# Patient Record
Sex: FEMALE | Race: WHITE | Employment: UNEMPLOYED | URBAN - METROPOLITAN AREA
[De-identification: names, ages, dates, MRNs, and addresses within clinical notes are randomized per-mention and may not be internally consistent; named-entity substitution may affect disease eponyms.]

---
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
## 2020-07-16 ENCOUNTER — ANESTHESIA EVENT (OUTPATIENT)
Dept: SURGERY | Age: 20
End: 2020-07-16
Payer: COMMERCIAL

## 2020-07-18 ENCOUNTER — HOSPITAL ENCOUNTER (OUTPATIENT)
Dept: PREADMISSION TESTING | Age: 20
Discharge: HOME | End: 2020-07-18
Attending: NURSE PRACTITIONER
Payer: COMMERCIAL

## 2020-07-18 DIAGNOSIS — Z20.822 ENCOUNTER FOR LABORATORY TESTING FOR COVID-19 VIRUS: ICD-10-CM

## 2020-07-18 PROCEDURE — 87635 SARS-COV-2 COVID-19 AMP PRB: CPT

## 2020-07-20 LAB — SARS-COV-2, COV2NT: NOT DETECTED

## 2020-07-21 NOTE — H&P
Gynecology History and Physical    Name: Thais Fay MRN: [id number] SSN: [id number]    YOB: 2000  Age: 23 y.o. Sex: female       Subjective:      Chief complaint:  Left ovarian cyst    Thurmon Angelucci is a 23 y.o.  female with a history of enlarging complex left ovarian cyst suspicious for dermoid. Ultrasound imaging done 6/19/20 showed a 4cm complext cyst with solid components. Previous treatment measures included observation but the cyst increased in size over 3 months. She has intermittent pelvic pain but it is usually tolerable. She is admitted for Procedure(s) (LRB):  LAPAROSCOPIC LEFT OVARIAN CYSTECTOMY, POSSIBLE OOPHORECTOMY (Left). The current method of family planning is oral contraceptives. Past Medical History:   Diagnosis Date    Anxiety     Gastritis     GERD (gastroesophageal reflux disease)     Mono exposure      Past Surgical History:   Procedure Laterality Date    HX ENDOSCOPY      HX WISDOM TEETH EXTRACTION      UPPER GI ENDOSCOPY,BIOPSY  3/21/2017          No Known Allergies  Prior to Admission medications    Medication Sig Start Date End Date Taking? Authorizing Provider   norgestrel-ethinyl estradiol (LOW-OGESTREL, 28, PO) Take 1 Tab by mouth nightly. Yes Provider, Historical      Family History   Problem Relation Age of Onset    No Known Problems Mother     Other Father         \"mean\" after anesthesia    Cancer Maternal Grandfather         prostate/lung    Cancer Paternal Grandfather         ? where it started    No Known Problems Maternal Grandmother     No Known Problems Paternal Grandmother     Other Brother         AUTISM    Anesth Problems Neg Hx      OB History    No obstetric history on file.        Social History     Socioeconomic History    Marital status: SINGLE     Spouse name: Not on file    Number of children: Not on file    Years of education: Not on file    Highest education level: Not on file   Occupational History    Not on file Social Needs    Financial resource strain: Not on file    Food insecurity     Worry: Not on file     Inability: Not on file    Transportation needs     Medical: Not on file     Non-medical: Not on file   Tobacco Use    Smoking status: Never Smoker    Smokeless tobacco: Never Used   Substance and Sexual Activity    Alcohol use: Yes     Alcohol/week: 2.0 standard drinks     Types: 2 Cans of beer per week     Comment: 2/WK    Drug use: No    Sexual activity: Never   Lifestyle    Physical activity     Days per week: Not on file     Minutes per session: Not on file    Stress: Not on file   Relationships    Social connections     Talks on phone: Not on file     Gets together: Not on file     Attends Jain service: Not on file     Active member of club or organization: Not on file     Attends meetings of clubs or organizations: Not on file     Relationship status: Not on file    Intimate partner violence     Fear of current or ex partner: Not on file     Emotionally abused: Not on file     Physically abused: Not on file     Forced sexual activity: Not on file   Other Topics Concern    Not on file   Social History Narrative    Not on file       Review of Systems:  A comprehensive review of systems was negative except for that written in the History of Present Illness. Objective:     Vitals:    07/21/20 1100   Weight: 59 kg (130 lb)   Height: 5' 9\" (1.753 m)       General:  alert, cooperative, no distress, appears stated age   Skin:  Normal.   Eyes: negative   Mouth: MMM no lesions   Lymph Nodes:  Cervical, supraclavicular, and axillary nodes normal.   Lungs:  Normal effort   Heart:  regular rate and rhythm   Abdomen: soft, non-tender.  Bowel sounds normal. No masses,  no organomegaly   CVA:  absent   Genitourinary: defer exam   Extremities:  extremities normal, atraumatic, no cyanosis or edema   Neurologic:  negative   Psychiatric:  non focal         Assessment:     Complex left ovarian cyst suspicious for dermoid    Plan:     Procedure(s) (LRB):  LAPAROSCOPIC LEFT OVARIAN CYSTECTOMY, POSSIBLE OOPHORECTOMY (Left)  Discussed the risks of surgery including the risks of bleeding, infection, deep vein thrombosis, and surgical injuries to internal organs including but not limited to the bowels, bladder, rectum, and female reproductive organs. The patient understands the risks; any and all questions were answered to the patient's satisfaction. The patient was counseled at length about the risks of bertha Covid-19 during their perioperative period and any recovery window from their procedure. The patient was made aware that bertha Covid-19  may worsen their prognosis for recovering from their procedure and lend to a higher morbidity and/or mortality risk. All material risks, benefits, and reasonable alternatives including postponing the procedure were discussed. The patient does  wish to proceed with the procedure at this time.       Signed By:  Ana Martins MD     July 21, 2020

## 2020-07-21 NOTE — PERIOP NOTES
Preop phone call completed. Preop instructions and preop medications reveiwed with patient. Pt instructed to Purchase 2  4 oz bottles of CHG soap and instructed in use. PT INSTRUCTED TO GO TO CELL PHONE LOT DOS AND TO CALL REGISTRATION PRIOR TO [facility]. PT AGREED.

## 2020-07-22 ENCOUNTER — HOSPITAL ENCOUNTER (OUTPATIENT)
Age: 20
Setting detail: OUTPATIENT SURGERY
Discharge: HOME | End: 2020-07-22
Attending: OBSTETRICS & GYNECOLOGY | Admitting: OBSTETRICS & GYNECOLOGY
Payer: COMMERCIAL

## 2020-07-22 ENCOUNTER — ANESTHESIA (OUTPATIENT)
Dept: SURGERY | Age: 20
End: 2020-07-22
Payer: COMMERCIAL

## 2020-07-22 VITALS
OXYGEN SATURATION: 99 % | HEIGHT: 69 IN | BODY MASS INDEX: 19.26 KG/M2 | WEIGHT: 130 LBS | TEMPERATURE: 98 F | DIASTOLIC BLOOD PRESSURE: 66 MMHG | RESPIRATION RATE: 16 BRPM | SYSTOLIC BLOOD PRESSURE: 110 MMHG | HEART RATE: 72 BPM

## 2020-07-22 DIAGNOSIS — Z98.890 STATUS POST LAPAROSCOPY: Primary | ICD-10-CM

## 2020-07-22 LAB
HCG UR QL: NEGATIVE
HGB BLD-MCNC: 12.1 G/DL (ref 11.5–16)

## 2020-07-22 PROCEDURE — 81025 URINE PREGNANCY TEST: CPT

## 2020-07-22 PROCEDURE — 77030031139 HC SUT VCRL2 J&J -A: Performed by: OBSTETRICS & GYNECOLOGY

## 2020-07-22 PROCEDURE — 74011000250 HC RX REV CODE- 250: Performed by: NURSE ANESTHETIST, CERTIFIED REGISTERED

## 2020-07-22 PROCEDURE — 88307 TISSUE EXAM BY PATHOLOGIST: CPT

## 2020-07-22 PROCEDURE — 85018 HEMOGLOBIN: CPT

## 2020-07-22 PROCEDURE — 77030040922 HC BLNKT HYPOTHRM STRY -A

## 2020-07-22 PROCEDURE — 77030026438 HC STYL ET INTUB CARD -A: Performed by: ANESTHESIOLOGY

## 2020-07-22 PROCEDURE — 77030012770 HC TRCR OPT FX AMR -B: Performed by: OBSTETRICS & GYNECOLOGY

## 2020-07-22 PROCEDURE — 77030020829: Performed by: OBSTETRICS & GYNECOLOGY

## 2020-07-22 PROCEDURE — 77030016151 HC PROTCTR LNS DFOG COVD -B: Performed by: OBSTETRICS & GYNECOLOGY

## 2020-07-22 PROCEDURE — 77030008684 HC TU ET CUF COVD -B: Performed by: ANESTHESIOLOGY

## 2020-07-22 PROCEDURE — 76210000021 HC REC RM PH II 0.5 TO 1 HR: Performed by: OBSTETRICS & GYNECOLOGY

## 2020-07-22 PROCEDURE — 77030002933 HC SUT MCRYL J&J -A: Performed by: OBSTETRICS & GYNECOLOGY

## 2020-07-22 PROCEDURE — 76210000016 HC OR PH I REC 1 TO 1.5 HR: Performed by: OBSTETRICS & GYNECOLOGY

## 2020-07-22 PROCEDURE — 76010000153 HC OR TIME 1.5 TO 2 HR: Performed by: OBSTETRICS & GYNECOLOGY

## 2020-07-22 PROCEDURE — 74011250637 HC RX REV CODE- 250/637: Performed by: ANESTHESIOLOGY

## 2020-07-22 PROCEDURE — 77030018836 HC SOL IRR NACL ICUM -A: Performed by: OBSTETRICS & GYNECOLOGY

## 2020-07-22 PROCEDURE — 76060000034 HC ANESTHESIA 1.5 TO 2 HR: Performed by: OBSTETRICS & GYNECOLOGY

## 2020-07-22 PROCEDURE — 74011250636 HC RX REV CODE- 250/636: Performed by: NURSE ANESTHETIST, CERTIFIED REGISTERED

## 2020-07-22 PROCEDURE — 77030008756 HC TU IRR SUC STRY -B: Performed by: OBSTETRICS & GYNECOLOGY

## 2020-07-22 PROCEDURE — 77030040830 HC CATH URETH FOL MDII -A: Performed by: OBSTETRICS & GYNECOLOGY

## 2020-07-22 PROCEDURE — 77030011640 HC PAD GRND REM COVD -A: Performed by: OBSTETRICS & GYNECOLOGY

## 2020-07-22 PROCEDURE — 74011000250 HC RX REV CODE- 250: Performed by: OBSTETRICS & GYNECOLOGY

## 2020-07-22 PROCEDURE — 74011250636 HC RX REV CODE- 250/636: Performed by: ANESTHESIOLOGY

## 2020-07-22 RX ORDER — SODIUM CHLORIDE 0.9 % (FLUSH) 0.9 %
5-40 SYRINGE (ML) INJECTION AS NEEDED
Status: DISCONTINUED | OUTPATIENT
Start: 2020-07-22 | End: 2020-07-22 | Stop reason: HOSPADM

## 2020-07-22 RX ORDER — SODIUM CHLORIDE, SODIUM LACTATE, POTASSIUM CHLORIDE, CALCIUM CHLORIDE 600; 310; 30; 20 MG/100ML; MG/100ML; MG/100ML; MG/100ML
75 INJECTION, SOLUTION INTRAVENOUS CONTINUOUS
Status: DISCONTINUED | OUTPATIENT
Start: 2020-07-22 | End: 2020-07-22 | Stop reason: HOSPADM

## 2020-07-22 RX ORDER — MORPHINE SULFATE 10 MG/ML
2 INJECTION, SOLUTION INTRAMUSCULAR; INTRAVENOUS
Status: DISCONTINUED | OUTPATIENT
Start: 2020-07-22 | End: 2020-07-22 | Stop reason: HOSPADM

## 2020-07-22 RX ORDER — MIDAZOLAM HYDROCHLORIDE 1 MG/ML
INJECTION, SOLUTION INTRAMUSCULAR; INTRAVENOUS AS NEEDED
Status: DISCONTINUED | OUTPATIENT
Start: 2020-07-22 | End: 2020-07-22 | Stop reason: HOSPADM

## 2020-07-22 RX ORDER — SODIUM CHLORIDE 0.9 % (FLUSH) 0.9 %
5-40 SYRINGE (ML) INJECTION EVERY 8 HOURS
Status: DISCONTINUED | OUTPATIENT
Start: 2020-07-22 | End: 2020-07-22 | Stop reason: HOSPADM

## 2020-07-22 RX ORDER — HYDROMORPHONE HYDROCHLORIDE 1 MG/ML
0.2 INJECTION, SOLUTION INTRAMUSCULAR; INTRAVENOUS; SUBCUTANEOUS
Status: DISCONTINUED | OUTPATIENT
Start: 2020-07-22 | End: 2020-07-22 | Stop reason: HOSPADM

## 2020-07-22 RX ORDER — MIDAZOLAM HYDROCHLORIDE 1 MG/ML
0.5 INJECTION, SOLUTION INTRAMUSCULAR; INTRAVENOUS
Status: DISCONTINUED | OUTPATIENT
Start: 2020-07-22 | End: 2020-07-22 | Stop reason: HOSPADM

## 2020-07-22 RX ORDER — NEOSTIGMINE METHYLSULFATE 1 MG/ML
INJECTION, SOLUTION INTRAVENOUS AS NEEDED
Status: DISCONTINUED | OUTPATIENT
Start: 2020-07-22 | End: 2020-07-22 | Stop reason: HOSPADM

## 2020-07-22 RX ORDER — OXYCODONE AND ACETAMINOPHEN 5; 325 MG/1; MG/1
1 TABLET ORAL
Qty: 12 TAB | Refills: 0 | Status: SHIPPED | OUTPATIENT
Start: 2020-07-22 | End: 2020-07-25

## 2020-07-22 RX ORDER — MIDAZOLAM HYDROCHLORIDE 1 MG/ML
1 INJECTION, SOLUTION INTRAMUSCULAR; INTRAVENOUS AS NEEDED
Status: DISCONTINUED | OUTPATIENT
Start: 2020-07-22 | End: 2020-07-22 | Stop reason: HOSPADM

## 2020-07-22 RX ORDER — IBUPROFEN 600 MG/1
600 TABLET ORAL
Qty: 30 TAB | Refills: 2 | Status: SHIPPED | OUTPATIENT
Start: 2020-07-22

## 2020-07-22 RX ORDER — PROPOFOL 10 MG/ML
INJECTION, EMULSION INTRAVENOUS AS NEEDED
Status: DISCONTINUED | OUTPATIENT
Start: 2020-07-22 | End: 2020-07-22 | Stop reason: HOSPADM

## 2020-07-22 RX ORDER — HYDROMORPHONE HYDROCHLORIDE 2 MG/ML
INJECTION, SOLUTION INTRAMUSCULAR; INTRAVENOUS; SUBCUTANEOUS AS NEEDED
Status: DISCONTINUED | OUTPATIENT
Start: 2020-07-22 | End: 2020-07-22 | Stop reason: HOSPADM

## 2020-07-22 RX ORDER — SODIUM CHLORIDE 9 MG/ML
25 INJECTION, SOLUTION INTRAVENOUS CONTINUOUS
Status: DISCONTINUED | OUTPATIENT
Start: 2020-07-22 | End: 2020-07-22 | Stop reason: HOSPADM

## 2020-07-22 RX ORDER — SODIUM CHLORIDE, SODIUM LACTATE, POTASSIUM CHLORIDE, CALCIUM CHLORIDE 600; 310; 30; 20 MG/100ML; MG/100ML; MG/100ML; MG/100ML
125 INJECTION, SOLUTION INTRAVENOUS CONTINUOUS
Status: DISCONTINUED | OUTPATIENT
Start: 2020-07-22 | End: 2020-07-22 | Stop reason: HOSPADM

## 2020-07-22 RX ORDER — FENTANYL CITRATE 50 UG/ML
INJECTION, SOLUTION INTRAMUSCULAR; INTRAVENOUS AS NEEDED
Status: DISCONTINUED | OUTPATIENT
Start: 2020-07-22 | End: 2020-07-22 | Stop reason: HOSPADM

## 2020-07-22 RX ORDER — DEXAMETHASONE SODIUM PHOSPHATE 4 MG/ML
INJECTION, SOLUTION INTRA-ARTICULAR; INTRALESIONAL; INTRAMUSCULAR; INTRAVENOUS; SOFT TISSUE AS NEEDED
Status: DISCONTINUED | OUTPATIENT
Start: 2020-07-22 | End: 2020-07-22 | Stop reason: HOSPADM

## 2020-07-22 RX ORDER — ROPIVACAINE HYDROCHLORIDE 5 MG/ML
30 INJECTION, SOLUTION EPIDURAL; INFILTRATION; PERINEURAL ONCE
Status: DISCONTINUED | OUTPATIENT
Start: 2020-07-22 | End: 2020-07-22 | Stop reason: HOSPADM

## 2020-07-22 RX ORDER — FENTANYL CITRATE 50 UG/ML
50 INJECTION, SOLUTION INTRAMUSCULAR; INTRAVENOUS AS NEEDED
Status: DISCONTINUED | OUTPATIENT
Start: 2020-07-22 | End: 2020-07-22 | Stop reason: HOSPADM

## 2020-07-22 RX ORDER — LIDOCAINE HYDROCHLORIDE 20 MG/ML
INJECTION, SOLUTION EPIDURAL; INFILTRATION; INTRACAUDAL; PERINEURAL AS NEEDED
Status: DISCONTINUED | OUTPATIENT
Start: 2020-07-22 | End: 2020-07-22 | Stop reason: HOSPADM

## 2020-07-22 RX ORDER — DIPHENHYDRAMINE HYDROCHLORIDE 50 MG/ML
12.5 INJECTION, SOLUTION INTRAMUSCULAR; INTRAVENOUS AS NEEDED
Status: DISCONTINUED | OUTPATIENT
Start: 2020-07-22 | End: 2020-07-22 | Stop reason: HOSPADM

## 2020-07-22 RX ORDER — GLYCOPYRROLATE 0.2 MG/ML
INJECTION INTRAMUSCULAR; INTRAVENOUS AS NEEDED
Status: DISCONTINUED | OUTPATIENT
Start: 2020-07-22 | End: 2020-07-22 | Stop reason: HOSPADM

## 2020-07-22 RX ORDER — ROCURONIUM BROMIDE 10 MG/ML
INJECTION, SOLUTION INTRAVENOUS AS NEEDED
Status: DISCONTINUED | OUTPATIENT
Start: 2020-07-22 | End: 2020-07-22 | Stop reason: HOSPADM

## 2020-07-22 RX ORDER — PHENYLEPHRINE HCL IN 0.9% NACL 0.4MG/10ML
SYRINGE (ML) INTRAVENOUS AS NEEDED
Status: DISCONTINUED | OUTPATIENT
Start: 2020-07-22 | End: 2020-07-22 | Stop reason: HOSPADM

## 2020-07-22 RX ORDER — ONDANSETRON 2 MG/ML
INJECTION INTRAMUSCULAR; INTRAVENOUS AS NEEDED
Status: DISCONTINUED | OUTPATIENT
Start: 2020-07-22 | End: 2020-07-22 | Stop reason: HOSPADM

## 2020-07-22 RX ORDER — LIDOCAINE HYDROCHLORIDE 10 MG/ML
0.1 INJECTION, SOLUTION EPIDURAL; INFILTRATION; INTRACAUDAL; PERINEURAL AS NEEDED
Status: DISCONTINUED | OUTPATIENT
Start: 2020-07-22 | End: 2020-07-22 | Stop reason: HOSPADM

## 2020-07-22 RX ORDER — FENTANYL CITRATE 50 UG/ML
25 INJECTION, SOLUTION INTRAMUSCULAR; INTRAVENOUS
Status: DISCONTINUED | OUTPATIENT
Start: 2020-07-22 | End: 2020-07-22 | Stop reason: HOSPADM

## 2020-07-22 RX ORDER — BUPIVACAINE HYDROCHLORIDE 2.5 MG/ML
INJECTION, SOLUTION EPIDURAL; INFILTRATION; INTRACAUDAL AS NEEDED
Status: DISCONTINUED | OUTPATIENT
Start: 2020-07-22 | End: 2020-07-22 | Stop reason: HOSPADM

## 2020-07-22 RX ORDER — ACETAMINOPHEN 325 MG/1
650 TABLET ORAL ONCE
Status: COMPLETED | OUTPATIENT
Start: 2020-07-22 | End: 2020-07-22

## 2020-07-22 RX ORDER — DEXMEDETOMIDINE HYDROCHLORIDE 100 UG/ML
INJECTION, SOLUTION INTRAVENOUS AS NEEDED
Status: DISCONTINUED | OUTPATIENT
Start: 2020-07-22 | End: 2020-07-22 | Stop reason: HOSPADM

## 2020-07-22 RX ORDER — ONDANSETRON 2 MG/ML
4 INJECTION INTRAMUSCULAR; INTRAVENOUS AS NEEDED
Status: DISCONTINUED | OUTPATIENT
Start: 2020-07-22 | End: 2020-07-22 | Stop reason: HOSPADM

## 2020-07-22 RX ADMIN — ONDANSETRON 4 MG: 2 INJECTION INTRAMUSCULAR; INTRAVENOUS at 14:23

## 2020-07-22 RX ADMIN — DEXAMETHASONE SODIUM PHOSPHATE 8 MG: 4 INJECTION, SOLUTION INTRAMUSCULAR; INTRAVENOUS at 12:18

## 2020-07-22 RX ADMIN — ACETAMINOPHEN 650 MG: 325 TABLET ORAL at 10:05

## 2020-07-22 RX ADMIN — ONDANSETRON HYDROCHLORIDE 4 MG: 2 INJECTION, SOLUTION INTRAMUSCULAR; INTRAVENOUS at 13:18

## 2020-07-22 RX ADMIN — PROPOFOL 50 MG: 10 INJECTION, EMULSION INTRAVENOUS at 13:19

## 2020-07-22 RX ADMIN — GLYCOPYRROLATE 0.4 MG: 0.2 INJECTION, SOLUTION INTRAMUSCULAR; INTRAVENOUS at 13:20

## 2020-07-22 RX ADMIN — PROPOFOL 150 MG: 10 INJECTION, EMULSION INTRAVENOUS at 12:06

## 2020-07-22 RX ADMIN — Medication 2 MG: at 13:20

## 2020-07-22 RX ADMIN — PROPOFOL 50 MG: 10 INJECTION, EMULSION INTRAVENOUS at 12:07

## 2020-07-22 RX ADMIN — Medication 120 MCG: at 12:37

## 2020-07-22 RX ADMIN — HYDROMORPHONE HYDROCHLORIDE 1 MG: 2 INJECTION, SOLUTION INTRAMUSCULAR; INTRAVENOUS; SUBCUTANEOUS at 12:16

## 2020-07-22 RX ADMIN — SODIUM CHLORIDE, SODIUM LACTATE, POTASSIUM CHLORIDE, AND CALCIUM CHLORIDE 125 ML/HR: 600; 310; 30; 20 INJECTION, SOLUTION INTRAVENOUS at 10:30

## 2020-07-22 RX ADMIN — DEXMEDETOMIDINE HYDROCHLORIDE 10 MCG: 100 INJECTION, SOLUTION, CONCENTRATE INTRAVENOUS at 12:16

## 2020-07-22 RX ADMIN — ROCURONIUM BROMIDE 30 MG: 10 SOLUTION INTRAVENOUS at 12:06

## 2020-07-22 RX ADMIN — LIDOCAINE HYDROCHLORIDE 60 MG: 20 INJECTION, SOLUTION EPIDURAL; INFILTRATION; INTRACAUDAL; PERINEURAL at 12:06

## 2020-07-22 RX ADMIN — MIDAZOLAM HYDROCHLORIDE 5 MG: 1 INJECTION, SOLUTION INTRAMUSCULAR; INTRAVENOUS at 12:00

## 2020-07-22 RX ADMIN — DEXMEDETOMIDINE HYDROCHLORIDE 10 MCG: 100 INJECTION, SOLUTION, CONCENTRATE INTRAVENOUS at 12:18

## 2020-07-22 RX ADMIN — PROPOFOL 50 MG: 10 INJECTION, EMULSION INTRAVENOUS at 13:15

## 2020-07-22 RX ADMIN — FENTANYL CITRATE 100 MCG: 50 INJECTION, SOLUTION INTRAMUSCULAR; INTRAVENOUS at 12:06

## 2020-07-22 NOTE — OP NOTES
Gynecologic Laparoscopy Procedure Note    [facility] ID:     [facility]    [facility] Record Number: [id number]    YOB: 2000 July 22, 2020      Preoperative Diagnosis:   COMPLEX LEFT OVARIAN CYST    Postoperative Diagnosis: COMPLEX LEFT OVARIAN CYST    Surgeon: Jerold Alpers, MD    Assistant: Robel Archuleta MD    Anesthesia: General    Procedure: 1.laparoscopic left ovarian cystectomy    Estimated Blood Loss: 20ml    Pathology /Specimens: complex left ovarian cyst    Complications: none    Findings: 4cm left ovarian cyst with spillage c/w \"chocolate\" fluid, normal right ovary, normal tubes bilaterally, normal uterus    Signed By: Jerold Alpers, MD          Procedure in Detail:  The patient was taken to the operating room where she was placed in the supine position. After undergoing adequate general endotracheal anesthesia, she was placed up in the [facility] laparoscopy stirrups in the dorsal lithotomy position. The patient was then prepped and draped in the usual fashion and barbour catheter was placed into the bladder. Attention was first turned to the vagina where the speculum was placed in the vagina. The anterior lip of the cervix was grasped with an allis clamp. Hulka tenaculum was placed without difficulty. All other instruments were removed from the vagina and 's gloves were changed. Attention was then turned to the abdomen. After injection of local, a vertical incision was made in the umbilicus. Direct intra-abdominal location was confirmed visually with the optical trocar. C02 pnuemo-peritoneum was created without difficulty. There was no evidence of bowel injury or bleeding. 2 accessory ports were placed, a 5mm on the right and 10mm on the left lower quadrant in the same fashion under direct visualization and without apparent injury. The above findings were noted. The left ovarian cyst was identifed and incised with inadvertant spillage of chocolate fluid contents. The cyst wall was removed using the harmonic ace and irrigated. Only approximately 80% of the cyst was was able to be fully excised given firm attachment to the ovarian hilum, 20% of cyst wall left behind. Excellent hemostasis was noted. The collapsed cyst was removed through the 10mm trocar. All instruments were removed and CO2 gas was diffused. There was good hemostasis. Left lower quadrant fascia was closed with 0 vicryl. Skin incisions were closed with 4.0 Vicryl in a subcuticular stitch  on the skin. All instruments were removed from the vagina. The patient was awakened, extubated and taken to the recovery room in good condition. All counts were correct x 2.

## 2020-07-22 NOTE — ANESTHESIA POSTPROCEDURE EVALUATION
Post-Anesthesia Evaluation and Assessment    Patient: Reilly Lara MRN: [id number]  SSN: [id number]    YOB: 2000  Age: 23 y.o. Sex: female      I have evaluated the patient and they are stable and ready for discharge from the PACU. Cardiovascular Function/Vital Signs  Visit Vitals  /71   Pulse 84   Temp 36.9 °C (98.5 °F)   Resp 14   Ht 5' 9\" (1.753 m)   Wt 59 kg (130 lb)   SpO2 99%   BMI 19.20 kg/m²       Patient is status post General anesthesia for Procedure(s):  LAPAROSCOPIC LEFT OVARIAN CYSTECTOMY. Nausea/Vomiting: None    Postoperative hydration reviewed and adequate. Pain:  Pain Scale 1: Numeric (0 - 10) (07/22/20 0956)  Pain Intensity 1: 0 (07/22/20 0956)   Managed    Neurological Status: At baseline    Mental Status, Level of Consciousness: Alert and  oriented to person, place, and time    Pulmonary Status:   O2 Device: Room air (07/22/20 1341)   Adequate oxygenation and airway patent    Complications related to anesthesia: None    Post-anesthesia assessment completed. No concerns    Signed By: Arlene Arthur MD     July 22, 2020              Procedure(s):  LAPAROSCOPIC LEFT OVARIAN CYSTECTOMY. general    <BSHSIANPOST>    INITIAL Post-op Vital signs:   Vitals Value Taken Time   /71 7/22/2020  1:41 PM   Temp 36.9 °C (98.5 °F) 7/22/2020  1:41 PM   Pulse 88 7/22/2020  1:45 PM   Resp 12 7/22/2020  1:45 PM   SpO2 87 % 7/22/2020  1:45 PM   Vitals shown include unvalidated device data.

## 2020-07-22 NOTE — PERIOP NOTES
Patient left still with some nausea- given 2nd dose of zofran and Melissa Wei- states this helped a little. Offered to call for more medication but she said no. Was upset that she got narcotic during surgery since she asked for no narcotic.

## 2020-07-22 NOTE — DISCHARGE INSTRUCTIONS
Pelvic Laparoscopy: What to Expect at [address] [place] [address]     After surgery, it's normal to have a sore belly, cramping, or pain around the cuts the doctor made (incisions) for up to 4 days. You can expect to feel better and stronger each day. But you might get tired quickly and need pain medicine for a few days. Some people are able to return to work the day after surgery. Others need to recover for a few days to a few weeks before going back to work. Sometimes pressure from the gas used during surgery causes other side effects. You may have pain in your neck or shoulders. Or you may feel pressure on your bladder and need to urinate more often than usual. These side effects should go away in less than 4 days. Do not lift anything heavy while you are recovering so that your incisions can heal.  This care sheet gives you a general idea about how long it will take for you to recover. But each person recovers at a different pace. Follow the steps below to get better as quickly as possible. How can you care for yourself at home? Activity  · Rest when you feel tired. Getting enough sleep will help you recover. · Try to walk each day. Start out by walking a little more than you did the day before. Bit by bit, increase the amount you walk. Walking boosts blood flow and helps prevent pneumonia and constipation. · For 1 week, avoid lifting anything that would make you strain. This may include a child, heavy grocery bags and milk containers, a heavy briefcase or backpack, cat litter or dog food bags, or a vacuum . · Avoid strenuous activities, such as biking, jogging, weight lifting, and aerobic exercise, for 1 week. · You may shower. Pat the incisions dry when you are done. Do not take a bath for the first week after surgery or until your doctor tells you it is okay. · You may have some light vaginal bleeding. Wear sanitary pads if needed. Do not douche or use tampons.   · You may drive when you are no longer taking prescription pain medicine and can quickly move your foot from the gas pedal to the brake. You must also be able to sit comfortably for a long period of time, even if you do not plan to go far. You might get caught in traffic. · You may need to take a few days to a few weeks off work. It depends on the type of work you do and how you feel. · Do not have sex until your doctor tells you it is okay. Diet  · You can eat your normal diet. If your stomach is upset, try bland, low-fat foods like plain rice, broiled chicken, toast, and yogurt. · Drink plenty of fluids (unless your doctor tells you not to). · You may notice that your bowel movements are not regular right after your surgery. This is common. Try to avoid constipation and straining with bowel movements. You may want to take a fiber supplement every day. If you have not had a bowel movement after a couple of days, ask your doctor about taking a mild laxative. Medicines  · Your doctor will tell you if and when you can restart your medicines. He or she will also give you instructions about taking any new medicines. · If you take aspirin or some other blood thinner, ask your doctor if and when to start taking it again. Make sure that you understand exactly what your doctor wants you to do. · Be safe with medicines. Take pain medicines exactly as directed. ? If the doctor gave you a prescription medicine for pain, take it as prescribed. ? If you are not taking a prescription pain medicine, take an over-the-counter medicine such as acetaminophen (Tylenol), ibuprofen (Advil, Motrin), or naproxen (Aleve). Read and follow all instructions on the label. ? Do not take two or more pain medicines at the same time unless the doctor told you to. Many pain medicines contain acetaminophen, which is Tylenol. Too much acetaminophen (Tylenol) can be harmful.   · If you think your pain medicine is making you sick to your stomach:  ? Take your medicine after meals (unless your doctor tells you not to). ? Ask your doctor for a different pain medicine. · If your doctor prescribed antibiotics, take them as directed. Do not stop taking them just because you feel better. You need to take the full course of antibiotics. Incision care  · If you have Band-Aid on the incisions. You can remove them tomorrow then shower gently washing the incisions. You have stitches that will dissolve on their own. · Wash the area daily with warm, soapy water and pat it dry. · Keep the area clean and dry. You may cover it with a gauze bandage if it weeps or rubs against clothing. Change the bandage every day. Other instructions  · Wear loose, comfortable clothing, and avoid anything that puts pressure on your belly, such as a girdle, for a few weeks. · You may want to use a ice pack or heating pad on your belly to help with pain. Follow-up care is a key part of your treatment and safety. Be sure to make and go to all appointments, and call your doctor if you are having problems. It's also a good idea to know your test results and keep a list of the medicines you take. When should you call for help? [id number] anytime you think you may need emergency care. For example, call if:  · You passed out (lost consciousness). · You have chest pain, are short of breath, or cough up blood. Call your doctor now or seek immediate medical care if:  · You have bright red vaginal bleeding that soaks one or more pads in an hour, or you have large clots. · You are sick to your stomach or cannot drink fluids. · You have vaginal discharge that has increased in amount or smells bad. · You have pain that does not get better after you take pain medicine. · You have signs of infection, such as:  ? Increased pain, swelling, warmth, or redness. ? Red streaks leading from the incision. ? Pus draining from the incision. ? A fever. · You have loose stitches, or your incisions come open.   · Bright red blood has soaked through the bandages over your incisions. · You have signs of a blood clot in your leg (called a deep vein thrombosis), such as:  ? Pain in your calf, back of the knee, thigh, or groin. ? Redness and swelling in your leg. · You cannot pass stools or gas. Watch closely for changes in your health, and be sure to contact your doctor if you have any problems. Where can you learn more? Go to http://anish-regino.info/  Enter M063 in the search box to learn more about \"Pelvic Laparoscopy: What to Expect at Home. \"  Current as of: November 8, 2019               Content Version: 12.5  © 0729-5429 [employer]. Care instructions adapted under license by [employer] (which disclaims liability or warranty for this information). If you have questions about a medical condition or this instruction, always ask your healthcare professional. [address] any warranty or liability for your use of this information. ______________________________________________________________________    Anesthesia Discharge Instructions    After general anesthesia or intervenous sedation, for 24 hours or while taking prescription Narcotics:  · Limit your activities  · Do not drive or operate hazardous machinery  · If you have not urinated within 8 hours after discharge, please contact your surgeon on call. · Do not make important personal or business decisions  · Do not drink alcoholic beverages    Report the following to your surgeon:  · Excessive pain, swelling, redness or odor of or around the surgical area  · Temperature over 100.5 degrees  · Nausea and vomiting lasting longer than 4 hours or if unable to take medication  · Any signs of decreased circulation or nerve impairment to extremity:  Change in color, persistent numbness, tingling, coldness or increased pain.   · Any questions    Learning About Coronavirus [phone])  Coronavirus [phone]): Overview  What is coronavirus (XDBIC-48)? The coronavirus disease (COVID-19) is caused by a virus. It is an illness that was first found in [place], [place], in December 2019. It has since spread worldwide. The virus can cause fever, cough, and trouble breathing. In severe cases, it can cause pneumonia and make it hard to breathe without help. It can cause death. Coronaviruses are a large group of viruses. They cause the common cold. They also cause more serious illnesses like Middle East respiratory syndrome (MERS) and severe acute respiratory syndrome (SARS). COVID-19 is caused by a novel coronavirus. That means it's a new type that has not been seen in people before. This virus spreads person-to-person through droplets from coughing and sneezing. It can also spread when you are close to someone who is infected. And it can spread when you touch something that has the virus on it, such as a doorknob or a tabletop. What can you do to protect yourself from coronavirus (COVID-19)? The best way to protect yourself from getting sick is to:  · Avoid areas where there is an outbreak. · Avoid contact with people who may be infected. · Wash your hands often with soap or alcohol-based hand sanitizers. · Avoid crowds and try to stay at least 6 feet away from other people. · Wash your hands often, especially after you cough or sneeze. Use soap and water, and scrub for at least 20 seconds. If soap and water aren't available, use an alcohol-based hand . · Avoid touching your mouth, nose, and eyes. What can you do to avoid spreading the virus to others? To help avoid spreading the virus to others:  · Cover your mouth with a tissue when you cough or sneeze. Then throw the tissue in the trash. · Use a disinfectant to clean things that you touch often. · Stay home if you are sick or have been exposed to the virus. Don't go to school, work, or public areas. And don't use public transportation.   · If you are sick:  ? Leave your home only if you need to get medical care. But call the doctor's office first so they know you're coming. And wear a face mask, if you have one.  ? If you have a face mask, wear it whenever you're around other people. It can help stop the spread of the virus when you cough or sneeze. ? Clean and disinfect your home every day. Use household  and disinfectant wipes or sprays. Take special care to clean things that you grab with your hands. These include doorknobs, remote controls, phones, and handles on your refrigerator and microwave. And don't forget countertops, tabletops, bathrooms, and computer keyboards. When to call for help  Call 911 anytime you think you may need emergency care. For example, call if:  · You have severe trouble breathing. (You can't talk at all.)  · You have constant chest pain or pressure. · You are severely dizzy or lightheaded. · You are confused or can't think clearly. · Your face and lips have a blue color. · You pass out (lose consciousness) or are very hard to wake up. Call your doctor now if you develop symptoms such as:  · Shortness of breath. · Fever. · Cough. If you need to get care, call ahead to the doctor's office for instructions before you go. Make sure you wear a face mask, if you have one, to prevent exposing other people to the virus. Where can you get the latest information? The following health organizations are tracking and studying this virus. Their websites contain the most up-to-date information. Purnima Safer also learn what to do if you think you may have been exposed to the virus. · U.S. Centers for Disease Control and Prevention (CDC): The CDC provides updated news about the disease and travel advice. The website also tells you how to prevent the spread of infection. www.cdc.gov  · World Health Organization [facility]): WHO offers information about the virus outbreaks.  WHO also has travel advice. www.who.int  Current as of: April 1, 2020               Content Version: 12.4  © 7071-0078 [employer]. Care instructions adapted under license by your healthcare professional. If you have questions about a medical condition or this instruction, always ask your healthcare professional. [address] any warranty or liability for your use of this information.

## 2020-07-22 NOTE — ANESTHESIA PREPROCEDURE EVALUATION
Anesthetic History   No history of anesthetic complications            Review of Systems / Medical History  Patient summary reviewed, nursing notes reviewed and pertinent labs reviewed    Pulmonary  Within defined limits                 Neuro/Psych   Within defined limits           Cardiovascular    Hypertension                   GI/Hepatic/Renal     GERD           Endo/Other  Within defined limits           Other Findings              Physical Exam    Airway  Mallampati: II  TM Distance: > 6 cm  Neck ROM: normal range of motion   Mouth opening: Normal     Cardiovascular  Regular rate and rhythm,  S1 and S2 normal,  no murmur, click, rub, or gallop             Dental  No notable dental hx       Pulmonary  Breath sounds clear to auscultation               Abdominal  GI exam deferred       Other Findings            Anesthetic Plan    ASA: 1  Anesthesia type: general          Induction: Intravenous  Anesthetic plan and risks discussed with: Patient

## 2020-07-22 NOTE — BRIEF OP NOTE
Brief Postoperative Note    Patient: Yue Robledo  YOB: 2000  MRN: [id number]    Date of Procedure: 7/22/2020     Pre-Op Diagnosis: COMPLEX LEFT OVARIAN CYST    Post-Op Diagnosis: same    Procedure(s):  LAPAROSCOPIC LEFT OVARIAN CYSTECTOMY    Surgeon(s):  MD Yaakov Escoto MD    Surgical Assistant: None    Anesthesia: General     Estimated Blood Loss (mL): less than 50     Complications: None    Specimens:   ID Type Source Tests Collected by Time Destination   1 : Left ovarian cyst Fresh Ovarian Cyst, Left  Gorge Bueno, Zenobia Membreno MD 7/22/2020 1257 Pathology        Implants: * No implants in log *    Drains: * No LDAs found *    Findings: 4cm left ovarian cyst with spillage c/w \"chocolate\" fluid, normal right ovary, normal tubes bilaterally, normal uterus    Electronically Signed by Lexie Mercado MD on 7/22/2020 at 1:12 PM

## 2020-07-22 NOTE — H&P
There has been no interval change from H&P dated 7/21/20. Patient is ready for surgery today. To OR for laparoscopic left ovarian cystectomy, possible oophorectomy. Consent obtained and all questions answered.

## 2020-07-31 NOTE — PROGRESS NOTES
[facility] [address], [address]  P [phone]  F [phone]    Office Note  Patient ID:  Name:  Kristan Reyna  MRN:  [id number]  :  2000/ y.o. Date:  8/3/2020      HISTORY OF PRESENT ILLNESS:  Kristan Reyna is a 23 y.o.  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 She is referred by Dr. Ignacio Roman. She was taken to the OR on 20 for an enlarging, complex, left ovarian cyst, suspicious for a dermoid. A pelvic ultrasound had demonstrated a 4 cm complex cyst with solid components. A left ovarian cystectomy was performed. The cyst was ruptured intraoperatively and was clinically consistent with a \"chocolate\" cyst.  According to Dr. Dyan Simon, the cyst was incompletely resected. Due to the surprise pathology findings, I have been asked to see her in consultation. FINAL PATHOLOGIC DIAGNOSIS   Left ovarian cyst, excision:   Borderline serous cystadenofibroma, see comment   Comment   The entire specimen is submitted for histologic evaluation. There are a few small foci of glandular crowding with cellular tufting and mild cytologic atypia compatible with borderline serous tumor. The largest focus measures 0.3 cm in greatest dimension. There is no evidence of invasive tumor. She has recovered well from her procedure and is without complaints today. ROS:   and GI review:  Negative  Cardiopulmonary review:  Negative   Musculoskeletal:  Negative    A comprehensive review of systems was negative except for that written in the History of Present Illness. , 10 point ROS      OB/GYN ROS:    Patient denies any abnormal bleeding or vaginal discharge.        Problem List:  Patient Active Problem List    Diagnosis Date Noted    Borderline serous cystadenoma of left ovary ([address] 2020    Epigastric abdominal pain 2017    Postprandial epigastric pain 03/17/2017    Hemoptysis 03/17/2017    Dyspepsia 03/17/2017    Abnormal weight loss 03/17/2017     PMH:  Past Medical History:   Diagnosis Date    Anxiety     Gastritis     GERD (gastroesophageal reflux disease)     Mono exposure       PSH:  Past Surgical History:   Procedure Laterality Date    HX ENDOSCOPY      HX WISDOM TEETH EXTRACTION      UPPER GI ENDOSCOPY,BIOPSY  3/21/2017           Social History:  Social History     Tobacco Use    Smoking status: Never Smoker    Smokeless tobacco: Never Used   Substance Use Topics    Alcohol use: Yes     Alcohol/week: 2.0 standard drinks     Types: 2 Cans of beer per week     Comment: 2/WK      Family History:  Family History   Problem Relation Age of Onset    No Known Problems Mother     Other Father         \"mean\" after anesthesia    Cancer Maternal Grandfather         prostate/lung    Cancer Paternal Grandfather         ? where it started    No Known Problems Maternal Grandmother     No Known Problems Paternal Grandmother     Other Brother         AUTISM    Anesth Problems Neg Hx       Medications: (reviewed)  Current Outpatient Medications   Medication Sig    famotidine (PEPCID) 20 mg tablet TK 1 T PO BID    ibuprofen (MOTRIN) 600 mg tablet Take 1 Tab by mouth every eight (8) hours as needed for Pain.  norgestrel-ethinyl estradiol (LOW-OGESTREL, 28, PO) Take 1 Tab by mouth nightly. No current facility-administered medications for this visit. Allergies: (reviewed)  No Known Allergies       OBJECTIVE:    Physical Exam:  VITAL SIGNS: Vitals:    08/03/20 1432   BP: 104/75   Pulse: 96   Weight: 132 lb 3.2 oz (60 kg)   Height: 5' 9.02\" (1.753 m)     Body mass index is 19.51 kg/m². GENERAL KRISTOPHER: Conversant, alert, oriented. No acute distress. HEENT: HEENT. No thyroid enlargement. No JVD. Neck: Supple without restrictions. RESPIRATORY: Clear to auscultation and percussion to the bases. No CVAT. CARDIOVASC: RRR without murmur/rub. GASTROINT: soft, non-tender, without masses or organomegaly   MUSCULOSKEL: no joint tenderness, deformity or swelling   EXTREMITIES: extremities normal, atraumatic, no cyanosis or edema   PELVIC: Deferred   RECTAL: Deferred   LORAINE SURVEY: No suspicious lymphadenopathy or edema noted. NEURO: Grossly intact. No acute deficit. Lab Data:    Lab Results   Component Value Date/Time    WBC 5.0 02/28/2017 05:44 PM    HGB 11.6 02/28/2017 05:44 PM    HCT 34.0 02/28/2017 05:44 PM    PLATELET 147 00/37/8267 05:44 PM    MCV 86.1 02/28/2017 05:44 PM     Lab Results   Component Value Date/Time    Sodium 139 02/28/2017 05:44 PM    Potassium 3.2 (L) 02/28/2017 05:44 PM    Chloride 105 02/28/2017 05:44 PM    CO2 27 02/28/2017 05:44 PM    Anion gap 7 02/28/2017 05:44 PM    Glucose 79 02/28/2017 05:44 PM    BUN 9 02/28/2017 05:44 PM    Creatinine 0.57 02/28/2017 05:44 PM    BUN/Creatinine ratio 16 02/28/2017 05:44 PM    GFR est AA Cannot be calulated 02/28/2017 05:44 PM    GFR est non-AA Cannot be calulated 02/28/2017 05:44 PM    Calcium 9.2 02/28/2017 05:44 PM         Imaging:  None      IMPRESSION/PLAN:  Michael Fiore is a 23 y.o. 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 I reviewed with Michael Fiore her medical records, physical exam, and review of symptoms. I explained to the patient and her mother that a borderline tumor is not an actual malignancy, but it's not a completely benign process either. It has the ability to spread, similar to cancer, but it is not an invasive process. They also have the ability to transform into a malignant process, though that is very uncommon. I discussed with them that there is data to support conservative management with cystectomy in younger patients, but only if the lesion is completely excised.   In this situation, where the cyst was ruptured and not excised completely, the safest thing would be to proceed with unilateral salpingooophorectomy. At the time of surgery I would also evaluate the contralateral ovary to make sure there are no lesions there. In addition to that, I will evaluate the peritoneal cavity to look for any implants. I would also sample the omentum and perform multiple peritoneal biopsies. There is no indication for a lymphadenectomy. She was counseled on the risks, benefits, indications, and alternatives of surgery. Her and her mother's questions were answered and she wishes to proceed as planned. She is moving back to college next week and would like to schedule the procedure for about 6 weeks out.         Signed By: Bertrand Pickering MD     8/3/2020/3:29 PM

## 2020-08-03 ENCOUNTER — OFFICE VISIT (OUTPATIENT)
Dept: GYNECOLOGY | Age: 20
End: 2020-08-03
Payer: COMMERCIAL

## 2020-08-03 VITALS
BODY MASS INDEX: 19.58 KG/M2 | HEART RATE: 96 BPM | SYSTOLIC BLOOD PRESSURE: 104 MMHG | WEIGHT: 132.2 LBS | DIASTOLIC BLOOD PRESSURE: 75 MMHG | HEIGHT: 69 IN

## 2020-08-03 DIAGNOSIS — C56.2 BORDERLINE SEROUS CYSTADENOMA OF LEFT OVARY (HCC): Primary | ICD-10-CM

## 2020-08-03 PROCEDURE — 99204 OFFICE O/P NEW MOD 45 MIN: CPT | Performed by: OBSTETRICS & GYNECOLOGY

## 2020-08-03 RX ORDER — FAMOTIDINE 20 MG/1
TABLET, FILM COATED ORAL
Status: ON HOLD | COMMUNITY
Start: 2020-06-23 | End: 2020-10-28

## 2020-08-03 NOTE — LETTER
8/3/2020 4:40 PM 
 
Patient:  Amarjit Marroquin YOB: 2000 Date of Visit: 8/3/2020 Dear Macie Wilson MD 
[address] [address] VIA In Basket Alexis Weiner MD 
[address] [facility] [address] VIA In Basket: Thank you for referring Ms. Alberto Mckeon to me for evaluation/treatment. Below are the relevant portions of my assessment and plan of care. [address], [address] [facility], [address] 
P [phone]  F [phone] Office Note Patient ID: 
Name:  Amarjit Marroquin MRN:  [id number] :  2000/ y.o. Date:  8/3/2020 HISTORY OF PRESENT ILLNESS: 
Amarjit Marroquin is a 23 y.o.  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 She is referred by Dr. Macie Wilson. She was taken to the OR on 20 for an enlarging, complex, left ovarian cyst, suspicious for a dermoid. A pelvic ultrasound had demonstrated a 4 cm complex cyst with solid components. A left ovarian cystectomy was performed. The cyst was ruptured intraoperatively and was clinically consistent with a \"chocolate\" cyst.  According to Dr. Chase Kinsey, the cyst was incompletely resected. Due to the surprise pathology findings, I have been asked to see her in consultation. FINAL PATHOLOGIC DIAGNOSIS Left ovarian cyst, excision:  
Borderline serous cystadenofibroma, see comment Comment The entire specimen is submitted for histologic evaluation. There are a few small foci of glandular crowding with cellular tufting and mild cytologic atypia compatible with borderline serous tumor. The largest focus measures 0.3 cm in greatest dimension. There is no evidence of invasive tumor. She has recovered well from her procedure and is without complaints today. ROS: 
 and GI review:  Negative Cardiopulmonary review:  Negative Musculoskeletal:  Negative A comprehensive review of systems was negative except for that written in the History of Present Illness. , 10 point ROS 
 
 
OB/GYN ROS: 
 Patient denies any abnormal bleeding or vaginal discharge. Problem List: 
Patient Active Problem List  
 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 
Past Medical History:  
Diagnosis Date  Anxiety  Gastritis  GERD (gastroesophageal reflux disease)  Mono exposure PSH: 
Past Surgical History:  
Procedure Laterality Date  HX ENDOSCOPY    
 HX WISDOM TEETH EXTRACTION    
 UPPER GI ENDOSCOPY,BIOPSY  3/21/2017 Social History: 
Social History Tobacco Use  Smoking status: Never Smoker  Smokeless tobacco: Never Used Substance Use Topics  Alcohol use: Yes Alcohol/week: 2.0 standard drinks Types: 2 Cans of beer per week Comment: 2/WK Family History: 
Family History Problem Relation Age of Onset  No Known Problems Mother  Other Father \"mean\" after anesthesia  Cancer Maternal Grandfather   
     prostate/lung  Cancer Paternal Grandfather ? where it started  No Known Problems Maternal Grandmother  No Known Problems Paternal Grandmother  Other Brother AUTISM  Anesth Problems Neg Hx Medications: (reviewed) Current Outpatient Medications Medication Sig  
 famotidine (PEPCID) 20 mg tablet TK 1 T PO BID  ibuprofen (MOTRIN) 600 mg tablet Take 1 Tab by mouth every eight (8) hours as needed for Pain.  norgestrel-ethinyl estradiol (LOW-OGESTREL, 28, PO) Take 1 Tab by mouth nightly. No current facility-administered medications for this visit. Allergies: (reviewed) No Known Allergies OBJECTIVE: 
 
Physical Exam: VITAL SIGNS: Vitals:  
 08/03/20 1432 BP: 104/75 Pulse: 96 Weight: 132 lb 3.2 oz (60 kg) Height: 5' 9.02\" (1.753 m) Body mass index is 19.51 kg/m². GENERAL KRISTOPHER: Conversant, alert, oriented. No acute distress. HEENT: HEENT. No thyroid enlargement. No JVD. Neck: Supple without restrictions. RESPIRATORY: Clear to auscultation and percussion to the bases. No CVAT. CARDIOVASC: RRR without murmur/rub. GASTROINT: soft, non-tender, without masses or organomegaly MUSCULOSKEL: no joint tenderness, deformity or swelling EXTREMITIES: extremities normal, atraumatic, no cyanosis or edema PELVIC: Deferred RECTAL: Deferred LORAINE SURVEY: No suspicious lymphadenopathy or edema noted. NEURO: Grossly intact. No acute deficit. Lab Data: 
 
Lab Results Component Value Date/Time WBC 5.0 02/28/2017 05:44 PM  
 HGB 11.6 02/28/2017 05:44 PM  
 HCT 34.0 02/28/2017 05:44 PM  
 PLATELET 283 29/92/1028 05:44 PM  
 MCV 86.1 02/28/2017 05:44 PM  
 
Lab Results Component Value Date/Time Sodium 139 02/28/2017 05:44 PM  
 Potassium 3.2 (L) 02/28/2017 05:44 PM  
 Chloride 105 02/28/2017 05:44 PM  
 CO2 27 02/28/2017 05:44 PM  
 Anion gap 7 02/28/2017 05:44 PM  
 Glucose 79 02/28/2017 05:44 PM  
 BUN 9 02/28/2017 05:44 PM  
 Creatinine 0.57 02/28/2017 05:44 PM  
 BUN/Creatinine ratio 16 02/28/2017 05:44 PM  
 GFR est AA Cannot be calulated 02/28/2017 05:44 PM  
 GFR est non-AA Cannot be calulated 02/28/2017 05:44 PM  
 Calcium 9.2 02/28/2017 05:44 PM  
 
 
 
Imaging: 
None IMPRESSION/PLAN: 
Jax Mccarthy is a 23 y.o. 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 I reviewed with Jax Mccarthy her medical records, physical exam, and review of symptoms.   I explained to the patient and her mother that a borderline tumor is not an actual malignancy, but it's not a completely benign process either. It has the ability to spread, similar to cancer, but it is not an invasive process. They also have the ability to transform into a malignant process, though that is very uncommon. I discussed with them that there is data to support conservative management with cystectomy in younger patients, but only if the lesion is completely excised. In this situation, where the cyst was ruptured and not excised completely, the safest thing would be to proceed with unilateral salpingooophorectomy. At the time of surgery I would also evaluate the contralateral ovary to make sure there are no lesions there. In addition to that, I will evaluate the peritoneal cavity to look for any implants. I would also sample the omentum and perform multiple peritoneal biopsies. There is no indication for a lymphadenectomy. She was counseled on the risks, benefits, indications, and alternatives of surgery. Her and her mother's questions were answered and she wishes to proceed as planned. She is moving back to college next week and would like to schedule the procedure for about 6 weeks out. Signed By: Yony Madrid MD   
 8/3/2020/3:29 PM  
 
 
New patient referred by Dr Tracie Matias for borderline ovarian tumor. If you have questions, please do not hesitate to call me. I look forward to following Ms. Dennis Tejada along with you.  
 
 
 
Sincerely, 
 
 
Yony Madrid MD

## 2020-08-10 ENCOUNTER — TELEPHONE (OUTPATIENT)
Dept: GYNECOLOGY | Age: 20
End: 2020-08-10

## 2020-08-10 NOTE — TELEPHONE ENCOUNTER
Nguyen Rowland, pt's mother is calling for the results from [place], ok to speak to her per GORDO on file, she may be reached at [phone]

## 2020-08-31 ENCOUNTER — TELEPHONE (OUTPATIENT)
Dept: GYNECOLOGY | Age: 20
End: 2020-08-31

## 2020-08-31 NOTE — TELEPHONE ENCOUNTER
The patients mother called to state the pt is out of state at college and academically it would be easier to have her surgery in mid November when she could finish the semester online. She is scheduled for 9/14. She wants to know 's thoughts on pushing it back.

## 2020-08-31 NOTE — TELEPHONE ENCOUNTER
Called Patti Rose and advised  said it would be okay to delay until mid November. She will discuss with the patient and call Page back to re-schedule if needed.

## 2020-10-15 ENCOUNTER — TRANSCRIBE ORDER (OUTPATIENT)
Dept: REGISTRATION | Age: 20
End: 2020-10-15

## 2020-10-15 DIAGNOSIS — Z01.812 PRE-PROCEDURE LAB EXAM: Primary | ICD-10-CM

## 2020-10-24 ENCOUNTER — HOSPITAL ENCOUNTER (OUTPATIENT)
Dept: PREADMISSION TESTING | Age: 20
Discharge: HOME | End: 2020-10-24
Payer: COMMERCIAL

## 2020-10-24 DIAGNOSIS — Z01.812 PRE-PROCEDURE LAB EXAM: ICD-10-CM

## 2020-10-24 PROCEDURE — 87635 SARS-COV-2 COVID-19 AMP PRB: CPT

## 2020-10-25 LAB — SARS-COV-2, COV2NT: NOT DETECTED

## 2020-10-27 ENCOUNTER — ANESTHESIA EVENT (OUTPATIENT)
Dept: SURGERY | Age: 20
End: 2020-10-27
Payer: COMMERCIAL

## 2020-10-27 NOTE — H&P
[address] [place] [address] Mathias Moritz [address], [address]  P [phone]  F [phone]        Patient ID:  Name:  Alec Samuels  MRN:  [id number]  :   y.o. Date:  10/27/2020      HISTORY OF PRESENT ILLNESS:  Alec Samuels is a 21 y.o.  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 She is referred by Dr. Brad Singleton. She was taken to the OR on 20 for an enlarging, complex, left ovarian cyst, suspicious for a dermoid. A pelvic ultrasound had demonstrated a 4 cm complex cyst with solid components. A left ovarian cystectomy was performed. The cyst was ruptured intraoperatively and was clinically consistent with a \"chocolate\" cyst.  According to Dr. Christi Faust, the cyst was incompletely resected. Due to the surprise pathology findings, I have been asked to see her in consultation. FINAL PATHOLOGIC DIAGNOSIS   Left ovarian cyst, excision:   Borderline serous cystadenofibroma, see comment   Comment   The entire specimen is submitted for histologic evaluation. There are a few small foci of glandular crowding with cellular tufting and mild cytologic atypia compatible with borderline serous tumor. The largest focus measures 0.3 cm in greatest dimension. There is no evidence of invasive tumor. ROS:   and GI review:  Negative  Cardiopulmonary review:  Negative   Musculoskeletal:  Negative    A comprehensive review of systems was negative except for that written in the History of Present Illness. , 10 point ROS      OB/GYN ROS:    Patient denies any abnormal bleeding or vaginal discharge.        Problem List:  Patient Active Problem List    Diagnosis Date Noted    Borderline serous cystadenoma of left ovary ([facility] [address] 2020    Epigastric abdominal pain 2017    Postprandial epigastric pain 2017    Hemoptysis 2017    Dyspepsia 2017    Abnormal weight loss 03/17/2017     PMH:  Past Medical History:   Diagnosis Date    Anxiety     Gastritis     GERD (gastroesophageal reflux disease)     Mono exposure       PSH:  Past Surgical History:   Procedure Laterality Date    HX ENDOSCOPY      HX WISDOM TEETH EXTRACTION      UPPER GI ENDOSCOPY,BIOPSY  3/21/2017           Social History:  Social History     Tobacco Use    Smoking status: Never Smoker    Smokeless tobacco: Never Used   Substance Use Topics    Alcohol use: Yes     Alcohol/week: 2.0 standard drinks     Types: 2 Cans of beer per week     Comment: 2/WK      Family History:  Family History   Problem Relation Age of Onset    No Known Problems Mother     Other Father         \"mean\" after anesthesia    Cancer Maternal Grandfather         prostate/lung    Cancer Paternal Grandfather         ? where it started    No Known Problems Maternal Grandmother     No Known Problems Paternal Grandmother     Other Brother         AUTISM    Anesth Problems Neg Hx       Medications: (reviewed)  No current facility-administered medications for this encounter. Current Outpatient Medications   Medication Sig    famotidine (PEPCID) 20 mg tablet TK 1 T PO BID    ibuprofen (MOTRIN) 600 mg tablet Take 1 Tab by mouth every eight (8) hours as needed for Pain.  norgestrel-ethinyl estradiol (LOW-OGESTREL, 28, PO) Take 1 Tab by mouth nightly. Allergies: (reviewed)  No Known Allergies       OBJECTIVE:    Physical Exam:  VITAL SIGNS: There were no vitals filed for this visit. There is no height or weight on file to calculate BMI. GENERAL KRISTOPHER: Conversant, alert, oriented. No acute distress. HEENT: HEENT. No thyroid enlargement. No JVD. Neck: Supple without restrictions. RESPIRATORY: Clear to auscultation and percussion to the bases. No CVAT. CARDIOVASC: RRR without murmur/rub.    GASTROINT: soft, non-tender, without masses or organomegaly   MUSCULOSKEL: no joint tenderness, deformity or swelling   EXTREMITIES: extremities normal, atraumatic, no cyanosis or edema   PELVIC: Deferred   RECTAL: Deferred   LORAINE SURVEY: No suspicious lymphadenopathy or edema noted. NEURO: Grossly intact. No acute deficit. Lab Data:    Lab Results   Component Value Date/Time    WBC 5.0 02/28/2017 05:44 PM    HGB 11.6 02/28/2017 05:44 PM    HCT 34.0 02/28/2017 05:44 PM    PLATELET 874 64/39/8665 05:44 PM    MCV 86.1 02/28/2017 05:44 PM     Lab Results   Component Value Date/Time    Sodium 139 02/28/2017 05:44 PM    Potassium 3.2 (L) 02/28/2017 05:44 PM    Chloride 105 02/28/2017 05:44 PM    CO2 27 02/28/2017 05:44 PM    Anion gap 7 02/28/2017 05:44 PM    Glucose 79 02/28/2017 05:44 PM    BUN 9 02/28/2017 05:44 PM    Creatinine 0.57 02/28/2017 05:44 PM    BUN/Creatinine ratio 16 02/28/2017 05:44 PM    GFR est AA Cannot be calulated 02/28/2017 05:44 PM    GFR est non-AA Cannot be calulated 02/28/2017 05:44 PM    Calcium 9.2 02/28/2017 05:44 PM         Imaging:  None      IMPRESSION/PLAN:  Kendell Romberg is a 21 y.o. 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 I reviewed with Kendell Romberg her medical records, physical exam, and review of symptoms. I explained to the patient and her mother that a borderline tumor is not an actual malignancy, but it's not a completely benign process either. It has the ability to spread, similar to cancer, but it is not an invasive process. They also have the ability to transform into a malignant process, though that is very uncommon. I discussed with them that there is data to support conservative management with cystectomy in younger patients, but only if the lesion is completely excised. In this situation, where the cyst was ruptured and not excised completely, the safest thing would be to proceed with unilateral salpingooophorectomy.   At the time of surgery I would also evaluate the contralateral ovary to make sure there are no lesions there. In addition to that, I will evaluate the peritoneal cavity to look for any implants. I would also sample the omentum and perform multiple peritoneal biopsies. There is no indication for a lymphadenectomy. She was counseled on the risks, benefits, indications, and alternatives of surgery. Her and her mother's questions were answered and she wishes to proceed as planned. Signed By: Oneyda Díaz MD     10/27/2020/3:29 PM       Date of Surgery Update:  Donovan John was seen and examined. History and physical has been reviewed. The patient has been examined. There have been no significant clinical changes since the completion of the originally dated History and Physical.  Patient identified by surgeon; surgical site was confirmed by patient and surgeon.     Signed By: Oneyda Díaz MD     October 28, 2020 7:19 AM

## 2020-10-28 ENCOUNTER — ANESTHESIA (OUTPATIENT)
Dept: SURGERY | Age: 20
End: 2020-10-28
Payer: COMMERCIAL

## 2020-10-28 ENCOUNTER — HOSPITAL ENCOUNTER (OUTPATIENT)
Age: 20
Setting detail: OUTPATIENT SURGERY
Discharge: HOME | End: 2020-10-28
Attending: OBSTETRICS & GYNECOLOGY | Admitting: OBSTETRICS & GYNECOLOGY
Payer: COMMERCIAL

## 2020-10-28 VITALS
DIASTOLIC BLOOD PRESSURE: 69 MMHG | TEMPERATURE: 98.3 F | WEIGHT: 135 LBS | OXYGEN SATURATION: 99 % | SYSTOLIC BLOOD PRESSURE: 108 MMHG | HEART RATE: 74 BPM | RESPIRATION RATE: 10 BRPM | HEIGHT: 69 IN | BODY MASS INDEX: 19.99 KG/M2

## 2020-10-28 DIAGNOSIS — G89.18 POSTOPERATIVE PAIN: Primary | ICD-10-CM

## 2020-10-28 LAB — HCG UR QL: NEGATIVE

## 2020-10-28 PROCEDURE — 77030012770 HC TRCR OPT FX AMR -B: Performed by: OBSTETRICS & GYNECOLOGY

## 2020-10-28 PROCEDURE — 77030008756 HC TU IRR SUC STRY -B: Performed by: OBSTETRICS & GYNECOLOGY

## 2020-10-28 PROCEDURE — 74011000258 HC RX REV CODE- 258: Performed by: OBSTETRICS & GYNECOLOGY

## 2020-10-28 PROCEDURE — 76210000020 HC REC RM PH II FIRST 0.5 HR: Performed by: OBSTETRICS & GYNECOLOGY

## 2020-10-28 PROCEDURE — 77030040830 HC CATH URETH FOL MDII -A: Performed by: OBSTETRICS & GYNECOLOGY

## 2020-10-28 PROCEDURE — 77030008684 HC TU ET CUF COVD -B: Performed by: NURSE ANESTHETIST, CERTIFIED REGISTERED

## 2020-10-28 PROCEDURE — 77030008771 HC TU NG SALEM SUMP -A: Performed by: NURSE ANESTHETIST, CERTIFIED REGISTERED

## 2020-10-28 PROCEDURE — 77030010507 HC ADH SKN DERMBND J&J -B: Performed by: OBSTETRICS & GYNECOLOGY

## 2020-10-28 PROCEDURE — 74011250636 HC RX REV CODE- 250/636: Performed by: NURSE ANESTHETIST, CERTIFIED REGISTERED

## 2020-10-28 PROCEDURE — 88305 TISSUE EXAM BY PATHOLOGIST: CPT

## 2020-10-28 PROCEDURE — 76060000034 HC ANESTHESIA 1.5 TO 2 HR: Performed by: OBSTETRICS & GYNECOLOGY

## 2020-10-28 PROCEDURE — 74011250637 HC RX REV CODE- 250/637: Performed by: ANESTHESIOLOGY

## 2020-10-28 PROCEDURE — 76210000016 HC OR PH I REC 1 TO 1.5 HR: Performed by: OBSTETRICS & GYNECOLOGY

## 2020-10-28 PROCEDURE — 77030034154 HC SHR COAG HARM ACE J&J -F: Performed by: OBSTETRICS & GYNECOLOGY

## 2020-10-28 PROCEDURE — 76010000149 HC OR TIME 1 TO 1.5 HR: Performed by: OBSTETRICS & GYNECOLOGY

## 2020-10-28 PROCEDURE — 74011000250 HC RX REV CODE- 250: Performed by: OBSTETRICS & GYNECOLOGY

## 2020-10-28 PROCEDURE — 77030040361 HC SLV COMPR DVT MDII -B: Performed by: OBSTETRICS & GYNECOLOGY

## 2020-10-28 PROCEDURE — 77030002933 HC SUT MCRYL J&J -A: Performed by: OBSTETRICS & GYNECOLOGY

## 2020-10-28 PROCEDURE — 81025 URINE PREGNANCY TEST: CPT

## 2020-10-28 PROCEDURE — 88112 CYTOPATH CELL ENHANCE TECH: CPT

## 2020-10-28 PROCEDURE — 77030040922 HC BLNKT HYPOTHRM STRY -A

## 2020-10-28 PROCEDURE — 74011000250 HC RX REV CODE- 250: Performed by: NURSE ANESTHETIST, CERTIFIED REGISTERED

## 2020-10-28 PROCEDURE — 77030020263 HC SOL INJ SOD CL0.9% LFCR 1000ML: Performed by: OBSTETRICS & GYNECOLOGY

## 2020-10-28 PROCEDURE — 88307 TISSUE EXAM BY PATHOLOGIST: CPT

## 2020-10-28 PROCEDURE — 77030008608 HC TRCR ENDOSC SMTH AMR -B: Performed by: OBSTETRICS & GYNECOLOGY

## 2020-10-28 PROCEDURE — 77030020829: Performed by: OBSTETRICS & GYNECOLOGY

## 2020-10-28 PROCEDURE — 2709999900 HC NON-CHARGEABLE SUPPLY: Performed by: OBSTETRICS & GYNECOLOGY

## 2020-10-28 PROCEDURE — 74011250636 HC RX REV CODE- 250/636: Performed by: ANESTHESIOLOGY

## 2020-10-28 PROCEDURE — 77030026438 HC STYL ET INTUB CARD -A: Performed by: NURSE ANESTHETIST, CERTIFIED REGISTERED

## 2020-10-28 PROCEDURE — 77030031139 HC SUT VCRL2 J&J -A: Performed by: OBSTETRICS & GYNECOLOGY

## 2020-10-28 PROCEDURE — 77030013079 HC BLNKT BAIR HGGR 3M -A: Performed by: NURSE ANESTHETIST, CERTIFIED REGISTERED

## 2020-10-28 RX ORDER — SODIUM CHLORIDE 0.9 % (FLUSH) 0.9 %
5-40 SYRINGE (ML) INJECTION AS NEEDED
Status: DISCONTINUED | OUTPATIENT
Start: 2020-10-28 | End: 2020-10-28 | Stop reason: HOSPADM

## 2020-10-28 RX ORDER — MIDAZOLAM HYDROCHLORIDE 1 MG/ML
0.5 INJECTION, SOLUTION INTRAMUSCULAR; INTRAVENOUS
Status: DISCONTINUED | OUTPATIENT
Start: 2020-10-28 | End: 2020-10-28 | Stop reason: HOSPADM

## 2020-10-28 RX ORDER — OXYCODONE AND ACETAMINOPHEN 5; 325 MG/1; MG/1
1 TABLET ORAL AS NEEDED
Status: DISCONTINUED | OUTPATIENT
Start: 2020-10-28 | End: 2020-10-28 | Stop reason: HOSPADM

## 2020-10-28 RX ORDER — PROPOFOL 10 MG/ML
INJECTION, EMULSION INTRAVENOUS
Status: DISCONTINUED | OUTPATIENT
Start: 2020-10-28 | End: 2020-10-28 | Stop reason: HOSPADM

## 2020-10-28 RX ORDER — MORPHINE SULFATE 10 MG/ML
2 INJECTION, SOLUTION INTRAMUSCULAR; INTRAVENOUS
Status: DISCONTINUED | OUTPATIENT
Start: 2020-10-28 | End: 2020-10-28 | Stop reason: HOSPADM

## 2020-10-28 RX ORDER — FENTANYL CITRATE 50 UG/ML
INJECTION, SOLUTION INTRAMUSCULAR; INTRAVENOUS AS NEEDED
Status: DISCONTINUED | OUTPATIENT
Start: 2020-10-28 | End: 2020-10-28 | Stop reason: HOSPADM

## 2020-10-28 RX ORDER — KETAMINE HYDROCHLORIDE 10 MG/ML
INJECTION, SOLUTION INTRAMUSCULAR; INTRAVENOUS AS NEEDED
Status: DISCONTINUED | OUTPATIENT
Start: 2020-10-28 | End: 2020-10-28 | Stop reason: HOSPADM

## 2020-10-28 RX ORDER — LIDOCAINE HYDROCHLORIDE 20 MG/ML
INJECTION, SOLUTION EPIDURAL; INFILTRATION; INTRACAUDAL; PERINEURAL AS NEEDED
Status: DISCONTINUED | OUTPATIENT
Start: 2020-10-28 | End: 2020-10-28 | Stop reason: HOSPADM

## 2020-10-28 RX ORDER — ONDANSETRON 2 MG/ML
4 INJECTION INTRAMUSCULAR; INTRAVENOUS AS NEEDED
Status: DISCONTINUED | OUTPATIENT
Start: 2020-10-28 | End: 2020-10-28 | Stop reason: HOSPADM

## 2020-10-28 RX ORDER — FENTANYL CITRATE 50 UG/ML
50 INJECTION, SOLUTION INTRAMUSCULAR; INTRAVENOUS AS NEEDED
Status: DISCONTINUED | OUTPATIENT
Start: 2020-10-28 | End: 2020-10-28 | Stop reason: HOSPADM

## 2020-10-28 RX ORDER — ONDANSETRON 2 MG/ML
INJECTION INTRAMUSCULAR; INTRAVENOUS AS NEEDED
Status: DISCONTINUED | OUTPATIENT
Start: 2020-10-28 | End: 2020-10-28 | Stop reason: HOSPADM

## 2020-10-28 RX ORDER — SODIUM CHLORIDE, SODIUM LACTATE, POTASSIUM CHLORIDE, CALCIUM CHLORIDE 600; 310; 30; 20 MG/100ML; MG/100ML; MG/100ML; MG/100ML
125 INJECTION, SOLUTION INTRAVENOUS CONTINUOUS
Status: DISCONTINUED | OUTPATIENT
Start: 2020-10-28 | End: 2020-10-28 | Stop reason: HOSPADM

## 2020-10-28 RX ORDER — DEXAMETHASONE SODIUM PHOSPHATE 4 MG/ML
INJECTION, SOLUTION INTRA-ARTICULAR; INTRALESIONAL; INTRAMUSCULAR; INTRAVENOUS; SOFT TISSUE AS NEEDED
Status: DISCONTINUED | OUTPATIENT
Start: 2020-10-28 | End: 2020-10-28 | Stop reason: HOSPADM

## 2020-10-28 RX ORDER — SODIUM CHLORIDE, SODIUM LACTATE, POTASSIUM CHLORIDE, CALCIUM CHLORIDE 600; 310; 30; 20 MG/100ML; MG/100ML; MG/100ML; MG/100ML
INJECTION, SOLUTION INTRAVENOUS
Status: DISCONTINUED | OUTPATIENT
Start: 2020-10-28 | End: 2020-10-28 | Stop reason: HOSPADM

## 2020-10-28 RX ORDER — KETOROLAC TROMETHAMINE 30 MG/ML
INJECTION, SOLUTION INTRAMUSCULAR; INTRAVENOUS AS NEEDED
Status: DISCONTINUED | OUTPATIENT
Start: 2020-10-28 | End: 2020-10-28 | Stop reason: HOSPADM

## 2020-10-28 RX ORDER — DEXMEDETOMIDINE HYDROCHLORIDE 100 UG/ML
INJECTION, SOLUTION INTRAVENOUS AS NEEDED
Status: DISCONTINUED | OUTPATIENT
Start: 2020-10-28 | End: 2020-10-28 | Stop reason: HOSPADM

## 2020-10-28 RX ORDER — DIPHENHYDRAMINE HYDROCHLORIDE 50 MG/ML
12.5 INJECTION, SOLUTION INTRAMUSCULAR; INTRAVENOUS AS NEEDED
Status: DISCONTINUED | OUTPATIENT
Start: 2020-10-28 | End: 2020-10-28 | Stop reason: HOSPADM

## 2020-10-28 RX ORDER — MIDAZOLAM HYDROCHLORIDE 1 MG/ML
1 INJECTION, SOLUTION INTRAMUSCULAR; INTRAVENOUS AS NEEDED
Status: DISCONTINUED | OUTPATIENT
Start: 2020-10-28 | End: 2020-10-28 | Stop reason: HOSPADM

## 2020-10-28 RX ORDER — SUCCINYLCHOLINE CHLORIDE 20 MG/ML
INJECTION INTRAMUSCULAR; INTRAVENOUS AS NEEDED
Status: DISCONTINUED | OUTPATIENT
Start: 2020-10-28 | End: 2020-10-28 | Stop reason: HOSPADM

## 2020-10-28 RX ORDER — SODIUM CHLORIDE 0.9 % (FLUSH) 0.9 %
5-40 SYRINGE (ML) INJECTION EVERY 8 HOURS
Status: DISCONTINUED | OUTPATIENT
Start: 2020-10-28 | End: 2020-10-28 | Stop reason: HOSPADM

## 2020-10-28 RX ORDER — NEOSTIGMINE METHYLSULFATE 1 MG/ML
INJECTION, SOLUTION INTRAVENOUS AS NEEDED
Status: DISCONTINUED | OUTPATIENT
Start: 2020-10-28 | End: 2020-10-28 | Stop reason: HOSPADM

## 2020-10-28 RX ORDER — ACETAMINOPHEN 325 MG/1
650 TABLET ORAL ONCE
Status: COMPLETED | OUTPATIENT
Start: 2020-10-28 | End: 2020-10-28

## 2020-10-28 RX ORDER — HYDROMORPHONE HYDROCHLORIDE 1 MG/ML
0.2 INJECTION, SOLUTION INTRAMUSCULAR; INTRAVENOUS; SUBCUTANEOUS
Status: DISCONTINUED | OUTPATIENT
Start: 2020-10-28 | End: 2020-10-28 | Stop reason: HOSPADM

## 2020-10-28 RX ORDER — MIDAZOLAM HYDROCHLORIDE 1 MG/ML
INJECTION, SOLUTION INTRAMUSCULAR; INTRAVENOUS AS NEEDED
Status: DISCONTINUED | OUTPATIENT
Start: 2020-10-28 | End: 2020-10-28 | Stop reason: HOSPADM

## 2020-10-28 RX ORDER — SODIUM CHLORIDE 9 MG/ML
25 INJECTION, SOLUTION INTRAVENOUS CONTINUOUS
Status: DISCONTINUED | OUTPATIENT
Start: 2020-10-28 | End: 2020-10-28 | Stop reason: HOSPADM

## 2020-10-28 RX ORDER — GLYCOPYRROLATE 0.2 MG/ML
INJECTION INTRAMUSCULAR; INTRAVENOUS AS NEEDED
Status: DISCONTINUED | OUTPATIENT
Start: 2020-10-28 | End: 2020-10-28 | Stop reason: HOSPADM

## 2020-10-28 RX ORDER — ROCURONIUM BROMIDE 10 MG/ML
INJECTION, SOLUTION INTRAVENOUS AS NEEDED
Status: DISCONTINUED | OUTPATIENT
Start: 2020-10-28 | End: 2020-10-28 | Stop reason: HOSPADM

## 2020-10-28 RX ORDER — TRAMADOL HYDROCHLORIDE 50 MG/1
50 TABLET ORAL
Qty: 20 TAB | Refills: 0 | Status: SHIPPED | OUTPATIENT
Start: 2020-10-28 | End: 2020-11-02

## 2020-10-28 RX ORDER — PROPOFOL 10 MG/ML
INJECTION, EMULSION INTRAVENOUS AS NEEDED
Status: DISCONTINUED | OUTPATIENT
Start: 2020-10-28 | End: 2020-10-28 | Stop reason: HOSPADM

## 2020-10-28 RX ORDER — FENTANYL CITRATE 50 UG/ML
25 INJECTION, SOLUTION INTRAMUSCULAR; INTRAVENOUS
Status: DISCONTINUED | OUTPATIENT
Start: 2020-10-28 | End: 2020-10-28 | Stop reason: HOSPADM

## 2020-10-28 RX ORDER — LIDOCAINE HYDROCHLORIDE 10 MG/ML
0.1 INJECTION, SOLUTION EPIDURAL; INFILTRATION; INTRACAUDAL; PERINEURAL AS NEEDED
Status: DISCONTINUED | OUTPATIENT
Start: 2020-10-28 | End: 2020-10-28 | Stop reason: HOSPADM

## 2020-10-28 RX ORDER — ONDANSETRON 4 MG/1
4 TABLET, ORALLY DISINTEGRATING ORAL
Qty: 20 TAB | Refills: 0 | Status: SHIPPED | OUTPATIENT
Start: 2020-10-28

## 2020-10-28 RX ADMIN — ROCURONIUM BROMIDE 5 MG: 10 SOLUTION INTRAVENOUS at 10:26

## 2020-10-28 RX ADMIN — ROCURONIUM BROMIDE 25 MG: 10 SOLUTION INTRAVENOUS at 10:38

## 2020-10-28 RX ADMIN — DEXMEDETOMIDINE HYDROCHLORIDE 10 MCG: 100 INJECTION, SOLUTION, CONCENTRATE INTRAVENOUS at 10:35

## 2020-10-28 RX ADMIN — DEXAMETHASONE SODIUM PHOSPHATE 8 MG: 4 INJECTION, SOLUTION INTRAMUSCULAR; INTRAVENOUS at 10:30

## 2020-10-28 RX ADMIN — LIDOCAINE HYDROCHLORIDE 60 MG: 20 INJECTION, SOLUTION EPIDURAL; INFILTRATION; INTRACAUDAL; PERINEURAL at 10:26

## 2020-10-28 RX ADMIN — PROPOFOL 75 MCG/KG/MIN: 10 INJECTION, EMULSION INTRAVENOUS at 10:35

## 2020-10-28 RX ADMIN — SODIUM CHLORIDE, POTASSIUM CHLORIDE, SODIUM LACTATE AND CALCIUM CHLORIDE: 600; 310; 30; 20 INJECTION, SOLUTION INTRAVENOUS at 09:30

## 2020-10-28 RX ADMIN — FENTANYL CITRATE 50 MCG: 50 INJECTION, SOLUTION INTRAMUSCULAR; INTRAVENOUS at 10:45

## 2020-10-28 RX ADMIN — MIDAZOLAM 4 MG: 1 INJECTION INTRAMUSCULAR; INTRAVENOUS at 10:19

## 2020-10-28 RX ADMIN — ACETAMINOPHEN 650 MG: 325 TABLET ORAL at 09:24

## 2020-10-28 RX ADMIN — KETOROLAC TROMETHAMINE 30 MG: 30 INJECTION, SOLUTION INTRAMUSCULAR; INTRAVENOUS at 11:17

## 2020-10-28 RX ADMIN — KETAMINE HYDROCHLORIDE 30 MG: 10 INJECTION, SOLUTION INTRAMUSCULAR; INTRAVENOUS at 11:03

## 2020-10-28 RX ADMIN — CEFOTETAN DISODIUM 2 G: 2 INJECTION, POWDER, FOR SOLUTION INTRAMUSCULAR; INTRAVENOUS at 10:35

## 2020-10-28 RX ADMIN — Medication 3 MG: at 11:18

## 2020-10-28 RX ADMIN — ONDANSETRON HYDROCHLORIDE 4 MG: 2 INJECTION, SOLUTION INTRAMUSCULAR; INTRAVENOUS at 10:30

## 2020-10-28 RX ADMIN — SUCCINYLCHOLINE CHLORIDE 100 MG: 20 INJECTION, SOLUTION INTRAMUSCULAR; INTRAVENOUS at 10:26

## 2020-10-28 RX ADMIN — GLYCOPYRROLATE 0.4 MG: 0.2 INJECTION, SOLUTION INTRAMUSCULAR; INTRAVENOUS at 11:18

## 2020-10-28 RX ADMIN — DEXMEDETOMIDINE HYDROCHLORIDE 4 MCG: 100 INJECTION, SOLUTION, CONCENTRATE INTRAVENOUS at 11:17

## 2020-10-28 RX ADMIN — ONDANSETRON HYDROCHLORIDE 4 MG: 2 INJECTION, SOLUTION INTRAMUSCULAR; INTRAVENOUS at 11:17

## 2020-10-28 RX ADMIN — PROPOFOL 150 MG: 10 INJECTION, EMULSION INTRAVENOUS at 10:26

## 2020-10-28 RX ADMIN — FENTANYL CITRATE 50 MCG: 50 INJECTION, SOLUTION INTRAMUSCULAR; INTRAVENOUS at 10:26

## 2020-10-28 RX ADMIN — SODIUM CHLORIDE, SODIUM LACTATE, POTASSIUM CHLORIDE, AND CALCIUM CHLORIDE 125 ML/HR: 600; 310; 30; 20 INJECTION, SOLUTION INTRAVENOUS at 09:23

## 2020-10-28 RX ADMIN — DEXMEDETOMIDINE HYDROCHLORIDE 10 MCG: 100 INJECTION, SOLUTION, CONCENTRATE INTRAVENOUS at 10:26

## 2020-10-28 NOTE — ANESTHESIA POSTPROCEDURE EVALUATION
Post-Anesthesia Evaluation and Assessment    Patient: Martine Reeder MRN: [id number]  SSN: [id number]    YOB: 2000  Age: 21 y.o. Sex: female      I have evaluated the patient and they are stable and ready for discharge from the PACU. Cardiovascular Function/Vital Signs  Visit Vitals  /63   Pulse 72   Temp 36.8 °C (98.3 °F)   Resp 11   Ht 5' 9\" (1.753 m)   Wt 61.2 kg (135 lb)   SpO2 100%   BMI 19.94 kg/m²       Patient is status post General anesthesia for Procedure(s):  LAPAROSCOPIC LEFT SALPINGO-OOPHORECTOMY , PERITONEAL BIOPIES, OMENTAL BIOPSIES. Nausea/Vomiting: None    Postoperative hydration reviewed and adequate. Pain:  Pain Scale 1: Numeric (0 - 10) (10/28/20 1229)  Pain Intensity 1: 0 (10/28/20 1229)   Managed    Neurological Status:   Neuro (WDL): Exceptions to WDL (10/28/20 1229)  Neuro  Neurologic State: Alert;Drowsy (10/28/20 1229)  Orientation Level: Oriented X4 (10/28/20 1229)  Cognition: Follows commands (10/28/20 1229)  Speech: Clear (10/28/20 1229)  LUE Motor Response: Purposeful (10/28/20 1229)  LLE Motor Response: Purposeful (10/28/20 1229)  RUE Motor Response: Purposeful (10/28/20 1229)  RLE Motor Response: Purposeful (10/28/20 1229)   At baseline    Mental Status, Level of Consciousness: Alert and  oriented to person, place, and time    Pulmonary Status:   O2 Device: Room air (10/28/20 1229)   Adequate oxygenation and airway patent    Complications related to anesthesia: None    Post-anesthesia assessment completed. No concerns    Signed By: John Oquendo MD     October 28, 2020              Procedure(s):  LAPAROSCOPIC LEFT SALPINGO-OOPHORECTOMY , PERITONEAL BIOPIES, OMENTAL BIOPSIES.     general    <BSHSIANPOST>    INITIAL Post-op Vital signs:   Vitals Value Taken Time   /63 10/28/2020 12:30 PM   Temp 36.8 °C (98.3 °F) 10/28/2020 12:29 PM   Pulse 83 10/28/2020 12:39 PM   Resp 11 10/28/2020 12:39 PM   SpO2 100 % 10/28/2020 12:39 PM   Vitals shown include unvalidated device data.

## 2020-10-28 NOTE — ANESTHESIA PREPROCEDURE EVALUATION
Relevant Problems   No relevant active problems       Anesthetic History   No history of anesthetic complications            Review of Systems / Medical History  Patient summary reviewed, nursing notes reviewed and pertinent labs reviewed    Pulmonary  Within defined limits                 Neuro/Psych   Within defined limits           Cardiovascular  Within defined limits                Exercise tolerance: >4 METS     GI/Hepatic/Renal  Within defined limits   GERD           Endo/Other  Within defined limits           Other Findings              Physical Exam    Airway  Mallampati: II  TM Distance: > 6 cm  Neck ROM: normal range of motion   Mouth opening: Normal     Cardiovascular  Regular rate and rhythm,  S1 and S2 normal,  no murmur, click, rub, or gallop             Dental  No notable dental hx       Pulmonary  Breath sounds clear to auscultation               Abdominal  GI exam deferred       Other Findings            Anesthetic Plan    ASA: 2  Anesthesia type: general          Induction: Intravenous  Anesthetic plan and risks discussed with: Patient

## 2020-10-28 NOTE — BRIEF OP NOTE
Brief Postoperative Note    Patient: Niko Coelho  YOB: 2000  MRN: [id number]    Date of Procedure: 10/28/2020     Pre-Op Diagnosis: BORDERLINE TUMOR OF LEFT OVARY    Post-Op Diagnosis: Same as preoperative diagnosis. Procedure(s): LAPAROSCOPIC LEFT SALPINGOOOPHORECTOMY, PERITONEAL BIOPSIES, OMENTAL BIOPSY    Surgeon(s):  Beck Baker MD    Surgical Assistant: Physician Assistant: Austen Pizano PA-C    Anesthesia: General     Estimated Blood Loss (mL): Minimal    Complications: None    Specimens:   ID Type Source Tests Collected by Time Destination   1 : LEFT PELVIC SIDEWALL Shalini Ruvalcaba MD 10/28/2020 1056 Pathology   2 : BLADDER PERITONEUM Fresh Bladder  Beck Baker MD 10/28/2020 1057 Pathology   3 : LEFT PARACOLIC GUTTER Shalini Ruvalcaba MD 10/28/2020 1059 Pathology   4 : RIGHT PARACOLIC GUTTER Shalini Ruvalcaba MD 10/28/2020 1100 Pathology   5 : omentum biopsy Fresh Abdomen  Beck Baker MD 10/28/2020 1109 Pathology   6 : left fallopian tube and ovary Fresh Abdomen  Beck Baker MD 10/28/2020 1111 Pathology   1 : PELVIC WASHINGS Fresh Pelvis  Beck Baker MD 10/28/2020 1055 Cytology        Implants: * No implants in log *    Drains: * No LDAs found *    Findings: Cystic left ovary. Normal right tube and ovary. Possible endometriosis implant on the bladder serosa. No appreciable peritoneal disease.     Electronically Signed by Kirstie Andrews MD on 10/28/2020 at 11:16 AM

## 2020-10-28 NOTE — PERIOP NOTES
Postoperative discharge instructions reviewed with patient and her mother and all questions answered.

## 2020-10-28 NOTE — OP NOTES
Gynecologic Oncology Operative Report    Koki Chapa  10/28/2020    Pre-operative dx:  Serous borderline tumor of the left ovary    Post-operative dx:  Serous borderline tumor of the left ovary    Procedure:  Laparoscopic left salpingooophorectomy, peritoneal biopsies, omental biopsy    Surgeon:  Floyd Leiva MD    Assistant:  Shayan Hutchinson PA-C     Anesthesia:  GETA    EBL:  <47 cc    Complications:  None    Implants:  None    Specimens:   ID Type Source Tests Collected by Time Destination   1 : LEFT PELVIC SIDEWALL Fresh     Tracey De Dios MD 10/28/2020 1056 Pathology   2 : BLADDER PERITONEUM Fresh Bladder   Tracey De Dios MD 10/28/2020 1057 Pathology   3 : LEFT PARACOLIC GUTTER García De Dios MD 10/28/2020 1059 Pathology   4 : RIGHT PARACOLIC GUTTER García De Dios MD 10/28/2020 1100 Pathology   5 : omentum biopsy Fresh Abdomen   Tracey De Dios MD 10/28/2020 1109 Pathology   6 : left fallopian tube and ovary Fresh Abdomen   Tracey De Dios MD 10/28/2020 1111 Pathology   1 : PELVIC WASHINGS Fresh Pelvis   Tracey De Dios MD 10/28/2020 1055 Cytology     Operative indications:  22 yo WF with serous borderline tumor of the left ovary, s/p cystectomy and incomplete resection. I recommended LSO with staging biopsies. Operative findings:  Cystic left ovary. Normal right tube and ovary. Possible endometriosis implant on the bladder serosa. No appreciable peritoneal disease. Procedure in detail:  After the risks, benefits, indications, and alternatives of the procedure were discussed with the patient and informed consent was obtained, the patient was taken to the operating room. She was positively identified, administered general anesthesia, and then placed in the dorsal lithotomy position in [address] [place] [address]. An exam under anesthesia was performed. She was then prepped and draped in the usual fashion.   A barbour catheter was inserted, followed by a sponge-stick in the vagina to act as a uterine manipulator. We then proceeded with laparoscopy by grasping the umbilicus on either side with Allis clamps. A small incision was made at the base with an #11-blade scalpel. A 5 mm blade-less trocar was then directly inserted, with the camera in position to visualize safe entry. Once proper placement was confirmed, the abdomen was then insufflated with carbon dioxide. A 5 mm blade-less trocar was inserted in the left lower quadrant and a 5 mm blade-less trocar was inserted in the right lower quadrant, each midway between the anterior superior iliac spine and the umbilicus, utilizing her prior laparoscopic sites. These trocars were inserted under direct visualization to ensure safe entry. The abdomen and pelvis were then examined, with the above mentioned findings noted. Pelvic washings were obtained as well. The patient was then placed in Trendelenburg tilt. Peritoneal biopsies were obtained of the left pelvic sidewall, and bilateral paracolic gutters using the Harmonic Scalpel. There was a small area consistent with endometriosis on the bladder serosa. This was excised completely. We then proceeded with the left salpingooophorectomy. The peritoneum between the round ligament and the ovary was grasped and elevated. It was then incised with the Harmonic Scalpel, allowing entry into the retroperitoneal space where the ureter was identified. The ovarian vessels were then coagulated and transected with the Harmonic scalpel. The ovary was then elevated and the fallopian tube and proper ovarian ligament were then coagulated and transected at the level of the uterine cornua. The adnexa was then placed in the cul de sac for later removal.      We then performed an omental biopsy. The omentum was grasped and pulled down toward the pelvis. There were no lesions noted. The omentum was divided with the Harmonic scalpel distal to the transverse colon.   The dissected omentum was then placed in the cul de sac with the left ovary. The 5 mm umbilical trocar was then removed and replaced with a 12 mm trocar. The left ovary and omentum were then placed in separate Endocatch bags and delivered through this site. This trocar site was then closed with a 0 Vicryl suture at the level of the fascia using the Erich-Marilia closure device. Excellent reapproximation and hemostasis was noted on both sides. The pelvis was then irrigated. Once satisfied with hemostasis, the gas was then allowed to escape from the abdomen and the trocars were removed. She was then taken out of Trendelenburg tilt. The incision sites were closed with a stitch of 4-0 Monocryl, followed by a layer of Dermabond. The patient was taken out of stirrups, awakened from anesthesia, and taken to the recovery room in stable condition. All instrument, sponge, and needle counts were correct.         Jamey Yang MD  10/28/2020  11:18 AM

## 2020-10-29 ENCOUNTER — TELEPHONE (OUTPATIENT)
Dept: GYNECOLOGY | Age: 20
End: 2020-10-29

## 2020-10-29 NOTE — TELEPHONE ENCOUNTER
Pt calling to schedule PO visit with Dr. Génesis Yee 4 weeks post sx d/t returning to [facility] and will not be back until Thanksgiving break. She will call for path results.

## 2021-03-23 ENCOUNTER — OFFICE VISIT (OUTPATIENT)
Dept: GYNECOLOGY | Age: 21
End: 2021-03-23
Payer: COMMERCIAL

## 2021-03-23 VITALS
HEIGHT: 69 IN | BODY MASS INDEX: 19.7 KG/M2 | WEIGHT: 133 LBS | SYSTOLIC BLOOD PRESSURE: 129 MMHG | DIASTOLIC BLOOD PRESSURE: 83 MMHG | HEART RATE: 86 BPM

## 2021-03-23 DIAGNOSIS — C56.2 BORDERLINE SEROUS CYSTADENOMA OF LEFT OVARY (HCC): Primary | ICD-10-CM

## 2021-03-23 PROCEDURE — 99213 OFFICE O/P EST LOW 20 MIN: CPT | Performed by: OBSTETRICS & GYNECOLOGY

## 2021-03-23 NOTE — PROGRESS NOTES
[address], [address]  P [phone]  F [phone]        Patient ID:  Name:  Jhon Lipscomb  MRN:  [id number]  :   y.o. Date:  3/23/2021      HISTORY OF PRESENT ILLNESS:  Jhon Lipscomb is a 21 y.o.  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 She is referred by Dr. Lundberg. She was taken to the OR on 20 for an enlarging, complex, left ovarian cyst, suspicious for a dermoid. A pelvic ultrasound had demonstrated a 4 cm complex cyst with solid components. A left ovarian cystectomy was performed. The cyst was ruptured intraoperatively and was clinically consistent with a \"chocolate\" cyst.  According to Dr. Jeremy Alvarado, the cyst was incompletely resected. Due to the surprise pathology findings, I have been asked to see her in consultation. FINAL PATHOLOGIC DIAGNOSIS   Left ovarian cyst, excision:   Borderline serous cystadenofibroma, see comment   Comment   The entire specimen is submitted for histologic evaluation. There are a few small foci of glandular crowding with cellular tufting and mild cytologic atypia compatible with borderline serous tumor. The largest focus measures 0.3 cm in greatest dimension. There is no evidence of invasive tumor. I reviewed with Jovita Upton her medical records, physical exam, and review of symptoms. I explained to her and her mother that a borderline tumor is not an actual malignancy, but it's not a completely benign process either. It has the ability to spread, similar to cancer, but it is not an invasive process. They also have the ability to transform into a malignant process, though that is very uncommon. I discussed with them that there is data to support conservative management with cystectomy in younger patients, but only if the lesion is completely excised.   In this situation, where the cyst was ruptured and not excised completely, the safest thing would be to proceed with unilateral salpingooophorectomy. At the time of surgery I would also evaluate the contralateral ovary to make sure there are no lesions there. In addition to that, I would evaluate the peritoneal cavity to look for any implants. I would also sample the omentum and perform multiple peritoneal biopsies. There was no indication for a lymphadenectomy. She was taken to the OR on 10/28/20 for laparoscopic LSO, peritoneal biopsies, and omental biopsies. Operative findings:  Cystic left ovary. Normal right tube and ovary. Possible endometriosis implant on the bladder serosa. No appreciable peritoneal disease. FINAL PATHOLOGIC DIAGNOSIS   1. Left pelvic sidewall; biopsy:   Benign fibromuscular and adipose tissue; no tumor identified. 2. Bladder peritoneum; biopsy:   Benign fibromuscular and adipose tissue; no tumor identified. 3. Left paracolic gutter; biopsy:   Benign fibromuscular and adipose tissue; no tumor identified. 4. Right paracolic gutter; biopsy:   Benign fibromuscular tissue; no tumor identified. 5. Omentum; biopsy:   Benign omental fibroadipose tissue; no tumor identified. One small benign lymph node (0/1). 6. Left fallopian tube and ovary; left salpingo-oophorectomy:   Atypical proliferative serous tumor (serous borderline tumor), (1.3 cm in greatest dimension); (see synoptic report). Benign fallopian tube with no significant histopathologic abnormality.    SYNOPTIC REPORT:   OVARY or FALLOPIAN TUBE or PRIMARY PERITONEUM   SPECIMEN   Procedure: Left salpingo-oophorectomy   Specimen Integrity of Left Ovary: Capsule intact   TUMOR   Tumor Site: Left ovary   Histologic Type: Serous borderline tumor / atypical proliferative serous tumor   Tumor Size: 1.3 cm   Ovarian Surface Involvement: Absent   Fallopian Tube Surface Involvement: Absent   Implants: Not identified   Other Tissue / Organ Involvement: Not identified   Peritoneal / Ascitic Fluid: Atypical (favored reactive)   LYMPH NODES   Regional Lymph Nodes: No carcinoma identified in one benign omental lymph node   PATHOLOGIC STAGE CLASSIFICATION (pTNM, AJCC 8th Edition)   Primary Tumor (pT): pT1a   Regional Lymph Nodes (pN): pN0   Comment   No evidence of invasion is noted in specimen #6. Deeper tissue levels of blocks 6C and 6D were examined. CYTOLOGIC INTERPRETATION  Pelvic Washing, ThinPrep and Cell block:   Few groups of mildly atypical epithelial cells, favor reactive mesothelial cells; (see Comment). Background of mesothelial cells and chronic inflammatory cells. General Categorization   Atypical   Specimen Adequacy   Satisfactory for evaluation. Comment   Cell block shows limited material composed predominantly of mesothelial cells and chronic inflammatory cells. No definitive evidence of malignancy is identified in the current specimen. Microscopic examination performed; see final diagnosis. She presents today for follow-up. She had a pelvic ultrasound with Dr.Stansbury Max yesterday, revealing an normal appearing right ovary and no evidence of free fluid. She is doing well and is without complaints. ROS:   and GI review:  Negative  Cardiopulmonary review:  Negative   Musculoskeletal:  Negative    A comprehensive review of systems was negative except for that written in the History of Present Illness. , 10 point ROS      OB/GYN ROS:    Patient denies any abnormal bleeding or vaginal discharge.        Problem List:  Patient Active Problem List    Diagnosis Date Noted    Borderline serous cystadenoma of left ovary ([facility] [address] 2020    Epigastric abdominal pain 2017    Postprandial epigastric pain 2017    Hemoptysis 2017    Dyspepsia 2017    Abnormal weight loss 2017     PMH:  Past Medical History:   Diagnosis Date    Anxiety     Gastritis     GERD (gastroesophageal reflux disease)     Mono exposure     Nausea & vomiting       PSH:  Past Surgical History:   Procedure Laterality Date    HX ENDOSCOPY      HX GYN  08/2020    ovarian cystectomy    HX HEENT      wisdom teeth extraction    HX WISDOM TEETH EXTRACTION      UPPER GI ENDOSCOPY,BIOPSY  3/21/2017           Social History:  Social History     Tobacco Use    Smoking status: Never Smoker    Smokeless tobacco: Never Used   Substance Use Topics    Alcohol use: Yes     Alcohol/week: 2.0 standard drinks     Types: 2 Cans of beer per week     Comment: 2/WK      Family History:  Family History   Problem Relation Age of Onset    No Known Problems Mother     Other Father         \"mean\" after anesthesia    Cancer Maternal Grandfather         prostate/lung    Cancer Paternal Grandfather         ? where it started    No Known Problems Maternal Grandmother     No Known Problems Paternal Grandmother     Other Brother         AUTISM    Anesth Problems Neg Hx       Medications: (reviewed)  Current Outpatient Medications   Medication Sig    ondansetron (ZOFRAN ODT) 4 mg disintegrating tablet Take 1 Tab by mouth every six (6) hours as needed for Nausea or Vomiting.  ibuprofen (MOTRIN) 600 mg tablet Take 1 Tab by mouth every eight (8) hours as needed for Pain.  norgestrel-ethinyl estradiol (LOW-OGESTREL, 28, PO) Take 1 Tab by mouth nightly. No current facility-administered medications for this visit. Allergies: (reviewed)  No Known Allergies       OBJECTIVE:    Physical Exam:  VITAL SIGNS: Vitals:    03/23/21 1115   BP: 129/83   Pulse: 86   Weight: 133 lb (60.3 kg)   Height: 5' 9.02\" (1.753 m)     Body mass index is 19.63 kg/m². GENERAL KRISTOPHER: Conversant, alert, oriented. No acute distress. HEENT: HEENT. No thyroid enlargement. No JVD. Neck: Supple without restrictions. RESPIRATORY: Clear to auscultation and percussion to the bases. No CVAT. CARDIOVASC: RRR without murmur/rub.    GASTROINT: soft, non-tender, without masses or organomegaly   MUSCULOSKEL: no joint tenderness, deformity or swelling   EXTREMITIES: extremities normal, atraumatic, no cyanosis or edema   PELVIC: Deferred   RECTAL: Deferred   LORAINE SURVEY: No suspicious lymphadenopathy or edema noted. NEURO: Grossly intact. No acute deficit. Lab Data:    Lab Results   Component Value Date/Time    WBC 5.0 02/28/2017 05:44 PM    HGB 11.6 02/28/2017 05:44 PM    HCT 34.0 02/28/2017 05:44 PM    PLATELET 834 45/27/7717 05:44 PM    MCV 86.1 02/28/2017 05:44 PM     Lab Results   Component Value Date/Time    Sodium 139 02/28/2017 05:44 PM    Potassium 3.2 (L) 02/28/2017 05:44 PM    Chloride 105 02/28/2017 05:44 PM    CO2 27 02/28/2017 05:44 PM    Anion gap 7 02/28/2017 05:44 PM    Glucose 79 02/28/2017 05:44 PM    BUN 9 02/28/2017 05:44 PM    Creatinine 0.57 02/28/2017 05:44 PM    BUN/Creatinine ratio 16 02/28/2017 05:44 PM    GFR est AA Cannot be calulated 02/28/2017 05:44 PM    GFR est non-AA Cannot be calulated 02/28/2017 05:44 PM    Calcium 9.2 02/28/2017 05:44 PM         Imaging:  None      IMPRESSION/PLAN:  Ana Wei is a 21 y.o. 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 She has no evidence of disease at this time. I have recommended that she continue follow-up with Dr. Carlos Andrea on a regular basis, along with getting occasional ultrasounds. She can return to see me as needed. An electronic signature was used to sign this note.     Kayden Mcdowell MD  03/23/21

## 2023-02-03 NOTE — PROGRESS NOTES
Called the pt to verify medication prescription. I left the pt a VM asking for a call back.    New patient referred by Dr Nirmal Torres for borderline ovarian tumor.

## (undated) DEVICE — STERILE POLYISOPRENE POWDER-FREE SURGICAL GLOVES WITH EMOLLIENT COATING: Brand: PROTEXIS

## (undated) DEVICE — FILTER SMK EVAC FLO CLR MEGADYNE

## (undated) DEVICE — TOWEL SURG W17XL27IN STD BLU COT NONFENESTRATED PREWASHED

## (undated) DEVICE — PAD PT POS 36 IN SURGYPAD DISP

## (undated) DEVICE — GLOVE SURG SZ 75 L1212IN FNGR THK138MIL BRN LTX FREE

## (undated) DEVICE — SOLUTION IRRIG 3000ML 0.9% SOD CHL FLX CONT 0797208] ICU MEDICAL INC]

## (undated) DEVICE — BW-412T DISP COMBO CLEANING BRUSH: Brand: SINGLE USE COMBINATION CLEANING BRUSH

## (undated) DEVICE — SUTURE SZ 0 27IN 5/8 CIR UR-6  TAPER PT VIOLET ABSRB VICRYL J603H

## (undated) DEVICE — COVER LT HNDL PLAS RIG 1 PER PK

## (undated) DEVICE — Device

## (undated) DEVICE — LAPAROSCOPIC TROCAR SLEEVE/SINGLE USE: Brand: KII® OPTICAL ACCESS SYSTEM

## (undated) DEVICE — SET EXTN TBNG L BOR 4 W STPCOCK ST 32IN PRIMING VOL 6ML

## (undated) DEVICE — TROCAR: Brand: KII® SLEEVE

## (undated) DEVICE — FORCEPS BX L240CM JAW DIA2.8MM L CAP W/ NDL MIC MESH TOOTH

## (undated) DEVICE — REM POLYHESIVE ADULT PATIENT RETURN ELECTRODE: Brand: VALLEYLAB

## (undated) DEVICE — ENDO CARRY-ON PROCEDURE KIT INCLUDES ENZYMATIC SPONGE, GAUZE, BIOHAZARD LABEL, TRAY, LUBRICANT, DIRTY SCOPE LABEL, WATER LABEL, TRAY, DRAWSTRING PAD, AND DEFENDO 4-PIECE KIT.: Brand: ENDO CARRY-ON PROCEDURE KIT

## (undated) DEVICE — PAD,SANITARY,11 IN,MAXI,N-STRL,IND WRAP: Brand: MEDLINE

## (undated) DEVICE — CUFF BLD PRSS AD SM SZ 10 FOR 20-26CM LIMB VLY SFT W/O TUBE

## (undated) DEVICE — PREP SKN CHLRAPRP APL 26ML STR --

## (undated) DEVICE — SYR 50ML LR LCK 1ML GRAD NSAF --

## (undated) DEVICE — KIT IV STRT W CHLORAPREP PD 1ML

## (undated) DEVICE — SOLUTION IV 1000ML 0.9% SOD CHL

## (undated) DEVICE — DRAPE,REIN 53X77,STERILE: Brand: MEDLINE

## (undated) DEVICE — SHEARS ENDOSCP L36CM DIA5MM ULTRASONIC CRV TIP W/ ADV

## (undated) DEVICE — STRAP,POSITIONING,KNEE/BODY,FOAM,4X60": Brand: MEDLINE

## (undated) DEVICE — VISUALIZATION SYSTEM: Brand: CLEARIFY

## (undated) DEVICE — TROCAR: Brand: KII® OPTICAL ACCESS SYSTEM

## (undated) DEVICE — NEEDLE HYPO 18GA L1.5IN PNK S STL HUB POLYPR SHLD REG BVL

## (undated) DEVICE — SUTURE MCRYL SZ 4-0 L27IN ABSRB UD L19MM PS-2 1/2 CIR PRIM Y426H

## (undated) DEVICE — SURGICAL PROCEDURE PACK GYN LAPAROSCOPY CUST SMH LF

## (undated) DEVICE — SOLIDIFIER FLUID 3000 CC ABSORB

## (undated) DEVICE — TUBING, SUCTION, 1/4" X 12', STRAIGHT: Brand: MEDLINE

## (undated) DEVICE — CONTAINER SPEC 20 ML LID NEUT BUFF FORMALIN 10 % POLYPR STS

## (undated) DEVICE — BLADE ASSEMB CLP HAIR FINE --

## (undated) DEVICE — BAG SPEC BIOHZD LF 2MIL 6X10IN -- CONVERT TO ITEM 357326

## (undated) DEVICE — TRAY PREP DRY W/ PREM GLV 2 APPL 6 SPNG 2 UNDPD 1 OVERWRAP

## (undated) DEVICE — INFECTION CONTROL KIT SYS

## (undated) DEVICE — CATH IV AUTOGRD BC BLU 22GA 25 -- INSYTE

## (undated) DEVICE — GARMENT,MEDLINE,DVT,INT,CALF,MED, GEN2: Brand: MEDLINE

## (undated) DEVICE — TOTAL TRAY, DB, 100% SILI FOLEY, 16FR 10: Brand: MEDLINE

## (undated) DEVICE — BAG BELONG PT PERS CLEAR HANDL

## (undated) DEVICE — DERMABOND SKIN ADH 0.7ML -- DERMABOND ADVANCED 12/BX

## (undated) DEVICE — 1200 GUARD II KIT W/5MM TUBE W/O VAC TUBE: Brand: GUARDIAN

## (undated) DEVICE — SET ADMIN 16ML TBNG L100IN 2 Y INJ SITE IV PIGGY BK DISP

## (undated) DEVICE — SYRINGE MED 20ML STD CLR PLAS LUERLOCK TIP N CTRL DISP

## (undated) DEVICE — CANN NASAL O2 CAPNOGRAPHY AD -- FILTERLINE

## (undated) DEVICE — KENDALL RADIOLUCENT FOAM MONITORING ELECTRODE -RECTANGULAR SHAPE: Brand: KENDALL